# Patient Record
Sex: MALE | Race: WHITE | NOT HISPANIC OR LATINO | Employment: FULL TIME | URBAN - METROPOLITAN AREA
[De-identification: names, ages, dates, MRNs, and addresses within clinical notes are randomized per-mention and may not be internally consistent; named-entity substitution may affect disease eponyms.]

---

## 2017-02-16 ENCOUNTER — HOSPITAL ENCOUNTER (OUTPATIENT)
Dept: RADIOLOGY | Facility: MEDICAL CENTER | Age: 37
End: 2017-02-16
Attending: NURSE PRACTITIONER
Payer: COMMERCIAL

## 2017-02-16 ENCOUNTER — OFFICE VISIT (OUTPATIENT)
Dept: URGENT CARE | Facility: PHYSICIAN GROUP | Age: 37
End: 2017-02-16
Payer: COMMERCIAL

## 2017-02-16 VITALS
TEMPERATURE: 98.7 F | SYSTOLIC BLOOD PRESSURE: 128 MMHG | DIASTOLIC BLOOD PRESSURE: 90 MMHG | HEIGHT: 69 IN | RESPIRATION RATE: 16 BRPM | OXYGEN SATURATION: 100 % | BODY MASS INDEX: 45.18 KG/M2 | HEART RATE: 72 BPM | WEIGHT: 305 LBS

## 2017-02-16 DIAGNOSIS — K80.00 CALCULUS OF GALLBLADDER WITH ACUTE CHOLECYSTITIS WITHOUT OBSTRUCTION: ICD-10-CM

## 2017-02-16 DIAGNOSIS — R10.13 ABDOMINAL PAIN, EPIGASTRIC: ICD-10-CM

## 2017-02-16 DIAGNOSIS — K82.9 GALLBLADDER ATTACK: ICD-10-CM

## 2017-02-16 PROCEDURE — 99204 OFFICE O/P NEW MOD 45 MIN: CPT | Mod: 25 | Performed by: NURSE PRACTITIONER

## 2017-02-16 PROCEDURE — 76705 ECHO EXAM OF ABDOMEN: CPT

## 2017-02-16 RX ORDER — KETOROLAC TROMETHAMINE 30 MG/ML
30 INJECTION, SOLUTION INTRAMUSCULAR; INTRAVENOUS ONCE
Status: COMPLETED | OUTPATIENT
Start: 2017-02-16 | End: 2017-02-16

## 2017-02-16 RX ORDER — TRAMADOL HYDROCHLORIDE 150 MG/1
CAPSULE, EXTENDED RELEASE ORAL
COMMUNITY
End: 2018-05-17

## 2017-02-16 RX ADMIN — KETOROLAC TROMETHAMINE 30 MG: 30 INJECTION, SOLUTION INTRAMUSCULAR; INTRAVENOUS at 10:18

## 2017-02-16 ASSESSMENT — ENCOUNTER SYMPTOMS
ANOREXIA: 1
FEVER: 0
HEARTBURN: 0
DIARRHEA: 0
NAUSEA: 1
ABDOMINAL PAIN: 1
VOMITING: 0
PALPITATIONS: 0
WEIGHT LOSS: 0
COUGH: 0

## 2017-02-16 NOTE — MR AVS SNAPSHOT
"        Noah Cervantes   2017 9:00 AM   Office Visit   MRN: [id number]    Department:  [facility]   Dept Phone:  [phone]    Description:  Male : 1980   Provider:  DARYN Luna           Reason for Visit     Abdominal Pain epigastric area, onset 4am      Allergies as of 2017     Allergen Noted Reactions    Dilaudid [Hydromorphone] 2017   Itching      You were diagnosed with     Abdominal pain, epigastric   [789.06.ICD-9-CM]       Gallbladder attack   [366273]         Vital Signs     Blood Pressure Pulse Temperature Respirations Height Weight    128/90 mmHg 72 37.1 °C (98.7 °F) 16 1.753 m (5' 9\") 138.347 kg (305 lb)    Body Mass Index Oxygen Saturation Smoking Status             45.02 kg/m2 100% Former Smoker         Basic Information     Date Of Birth Sex Race Ethnicity Preferred Language    1980 Male  or  Non- English      Your appointments     2017  2:30 PM   US ABDOMEN FASTING (30 MINUTES) with [facility]   [facility]    [address] [place] [address]  [facility] [address]   [phone]           NPO 8 hours.  For  Abdomen, NPO 2 hours, schedule Abdomen Complete and include \" Abdomen\" in Appt Notes.              Health Maintenance        Date Due Completion Dates    IMM DTaP/Tdap/Td Vaccine (1 - Tdap) 1998 ---    IMM INFLUENZA (1) 2016 ---            Current Immunizations     No immunizations on file.      Below and/or attached are the medications your provider expects you to take. Review all of your home medications and newly ordered medications with your provider and/or pharmacist. Follow medication instructions as directed by your provider and/or pharmacist. Please keep your medication list with you and share with your provider. Update the information when medications are discontinued, doses are changed, or new medications (including over-the-counter products) are added; and carry medication " information at all times in the event of emergency situations     Allergies:  DILAUDID - Itching               Medications  Valid as of: February 16, 2017 - 10:20 AM    Generic Name Brand Name Tablet Size Instructions for use    TraMADol HCl (CAPSULE SR 24 HR) TraMADol HCl 150 MG Take  by mouth.        .                 Medicines prescribed today were sent to:     [id number] - [place], [place] - [address]    [address] [place] [address]    Phone: [phone] Fax: [phone]    Open 24 Hours?: No      Medication refill instructions:       If your prescription bottle indicates you have medication refills left, it is not necessary to call your provider’s office. Please contact your pharmacy and they will refill your medication.    If your prescription bottle indicates you do not have any refills left, you may request refills at any time through one of the following ways: The online [employer] system (except Urgent Care), by calling your provider’s office, or by asking your pharmacy to contact your provider’s office with a refill request. Medication refills are processed only during regular business hours and may not be available until the next business day. Your provider may request additional information or to have a follow-up visit with you prior to refilling your medication.   *Please Note: Medication refills are assigned a new Rx number when refilled electronically. Your pharmacy may indicate that no refills were authorized even though a new prescription for the same medication is available at the pharmacy. Please request the medicine by name with the pharmacy before contacting your provider for a refill.        Your To Do List     Future Labs/Procedures Complete By Expires    US-GALLBLADDER  As directed 2/16/2018         [employer] Access Code: [email or web address]  Expires: 3/18/2017 10:20 AM    Your email address is not on file at [facility].  Email Addresses are required for you to sign up for  [employer] [facility], please contact [phone] to verify your personal information and to provide your email address prior to attempting to register for VM Discovery.    Noah Cervantes  [address]  [place] [address]    VM Discovery  A secure, online tool to manage your health information     [employer]’s VM Discovery® is a secure, online tool that connects you to your personalized health information from the privacy of your home -- day or night - making it very easy for you to manage your healthcare. Once the activation process is completed, you can even access your medical information using the VM Discovery ahsan, which is available for free in the Apple Ahsan store or Google Play store.     To learn more about VM Discovery, visit [email or web address]    There are two levels of access available (as shown below):   My Chart Features  [facility]  Specialists [facility]  Urgent  Care Non-[facility] Doctor   Email your healthcare team securely and privately 24/7 X X X    Manage appointments: schedule your next appointment; view details of past/upcoming appointments X      Request prescription refills. X      View recent personal medical records, including lab and immunizations X X X X   View health record, including health history, allergies, medications X X X X   Read reports about your outpatient visits, procedures, consult and ER notes X X X X   See your discharge summary, which is a recap of your hospital and/or ER visit that includes your diagnosis, lab results, and care plan X X  X     How to register for VM Discovery:  Once your e-mail address has been verified, follow the following steps to sign up for VM Discovery.     1. Go to  [email or web address]  2. Click on the Sign Up Now box, which takes you to the New Member Sign Up page. You will need to provide the following information:  a. Enter your VM Discovery Access Code exactly as it appears at the top of this page. (You will not need to use this code after  you’ve completed the sign-up process. If you do not sign up before the expiration date, you must request a new code.)   b. Enter your date of birth.   c. Enter your home email address.   d. Click Submit, and follow the next screen’s instructions.  3. Create a Vitet ID. This will be your Vitet login ID and cannot be changed, so think of one that is secure and easy to remember.  4. Create a Vitet password. You can change your password at any time.  5. Enter your Password Reset Question and Answer. This can be used at a later time if you forget your password.   6. Enter your e-mail address. This allows you to receive e-mail notifications when new information is available in [employer].  7. Click Sign Up. You can now view your health information.    For assistance activating your [employer] account, call [phone]

## 2017-02-16 NOTE — PROGRESS NOTES
"Subjective:      Noah Cervantes is a 36 y.o. male who presents with Abdominal Pain            Abdominal Pain  This is a new problem. The current episode started today (at 4 am). The onset quality is sudden. The problem occurs constantly. The problem has been gradually worsening. The pain is located in the epigastric region and RUQ. The pain is at a severity of 7/10. The pain is severe. The quality of the pain is colicky and sharp. The abdominal pain radiates to the back and right flank. Associated symptoms include anorexia and nausea. Pertinent negatives include no diarrhea, fever, vomiting or weight loss. Nothing aggravates the pain. The pain is relieved by nothing. He has tried nothing for the symptoms. His past medical history is significant for gallstones.       Review of Systems   Constitutional: Negative for fever, weight loss and malaise/fatigue.   Respiratory: Negative for cough.    Cardiovascular: Negative for chest pain, palpitations and leg swelling.   Gastrointestinal: Positive for nausea, abdominal pain and anorexia. Negative for heartburn, vomiting and diarrhea.   All other systems reviewed and are negative.    PMH:  has no past medical history on file.  MEDS:   Current outpatient prescriptions:   •  TraMADol HCl 150 MG CAPSULE SR 24 HR, Take  by mouth., Disp: , Rfl:     Current facility-administered medications:   •  ketorolac (TORADOL) injection 30 mg, 30 mg, Intramuscular, Once, DARYN Luna  ALLERGIES:   Allergies   Allergen Reactions   • Dilaudid [Hydromorphone] Itching     SURGHX: History reviewed. No pertinent past surgical history.  SOCHX:  reports that he quit smoking about 20 years ago. He has never used smokeless tobacco. He reports that he does not drink alcohol or use illicit drugs.  FH: family history is not on file.       Objective:     /90 mmHg  Pulse 72  Temp(Src) 37.1 °C (98.7 °F)  Resp 16  Ht 1.753 m (5' 9\")  Wt 138.347 kg (305 lb)  BMI 45.02 kg/m2  SpO2 " 100%     Physical Exam   Constitutional: He is oriented to person, place, and time. Vital signs are normal. He appears well-developed and well-nourished.   HENT:   Head: Normocephalic.   Eyes: EOM are normal. Pupils are equal, round, and reactive to light.   Neck: Normal range of motion.   Cardiovascular: Normal rate and regular rhythm.    Pulmonary/Chest: Effort normal.   Abdominal: Soft. Bowel sounds are normal. He exhibits no mass. There is tenderness in the right upper quadrant and epigastric area. There is rebound, guarding (RUQ and epigastric region) and positive Chino's sign.   Musculoskeletal: Normal range of motion.   Neurological: He is alert and oriented to person, place, and time.   Skin: Skin is warm and dry.   Psychiatric: He has a normal mood and affect. His behavior is normal. Thought content normal.   Vitals reviewed.         US pending  HISTORY/REASON FOR EXAM:  Abdominal and epigastric pain.      TECHNIQUE/EXAM DESCRIPTION AND NUMBER OF VIEWS:  Real-time sonography of the gallbladder.    COMPARISON: None    FINDINGS:  Hepatic echotexture is coarsened and increased.. No discrete mass is identified. The liver measures approximately 17.8 cm.    Shadowing calculi are in the gallbladder. There are several echogenic foci with ringdown artifact. The gallbladder wall measures approximately 0.34 cm  There is no pericholecystic fluid.    The common duct measures 0.51 cm.    The visualized pancreas is unremarkable.    The visualized aorta is normal in caliber.    Intrahepatic IVC is patent.    The portal vein is patent with hepatopetal flow.    The right kidney measures 10.86 cm.    There is no ascites.         Impression        1.  Cholelithiasis without wall thickening or pericholecystic fluid to suggest acute cholecystitis.    2.  Echogenic foci with ringdown artifact in the gallbladder are suggestive of adenomyomatosis.    3.  Increased hepatic echotexture, likely fatty infiltration. Hepatocellular  disease can have a similar appearance.        Assessment/Plan:     1. Abdominal pain, epigastric  2. Gallbladder attack  - US-GALLBLADDER; Future  - ketorolac (TORADOL) injection 30 mg; 1 mL by Intramuscular route Once.    Discussed results with patient. Advised him that I do not think this needs to be taken care of emergently, so I put in a referral for gen. Surgery.  Advised pt that if he feels his s/s are becoming increasingly worse or new concerning symptoms develop to be seen at the ER  He agrees with this POC.  Encouraged a diet of low-fat, non-fried foods

## 2017-02-17 ENCOUNTER — APPOINTMENT (OUTPATIENT)
Dept: RADIOLOGY | Facility: MEDICAL CENTER | Age: 37
End: 2017-02-17
Attending: EMERGENCY MEDICINE
Payer: COMMERCIAL

## 2017-02-17 ENCOUNTER — HOSPITAL ENCOUNTER (OUTPATIENT)
Facility: MEDICAL CENTER | Age: 37
End: 2017-02-19
Attending: EMERGENCY MEDICINE | Admitting: SURGERY
Payer: COMMERCIAL

## 2017-02-17 DIAGNOSIS — K81.0 ACUTE EMPHYSEMATOUS CHOLECYSTITIS: ICD-10-CM

## 2017-02-17 PROBLEM — K81.9 CHOLECYSTITIS: Status: ACTIVE | Noted: 2017-02-17

## 2017-02-17 LAB
ALBUMIN SERPL BCP-MCNC: 3.7 G/DL (ref 3.2–4.9)
ALBUMIN/GLOB SERPL: 1.1 G/DL
ALP SERPL-CCNC: 82 U/L (ref 30–99)
ALT SERPL-CCNC: 16 U/L (ref 2–50)
ANION GAP SERPL CALC-SCNC: 7 MMOL/L (ref 0–11.9)
APPEARANCE UR: CLEAR
AST SERPL-CCNC: 13 U/L (ref 12–45)
BASOPHILS # BLD AUTO: 0.7 % (ref 0–1.8)
BASOPHILS # BLD: 0.11 K/UL (ref 0–0.12)
BILIRUB SERPL-MCNC: 1.2 MG/DL (ref 0.1–1.5)
BILIRUB UR QL STRIP.AUTO: NEGATIVE
BUN SERPL-MCNC: 5 MG/DL (ref 8–22)
CALCIUM SERPL-MCNC: 9.1 MG/DL (ref 8.4–10.2)
CHLORIDE SERPL-SCNC: 104 MMOL/L (ref 96–112)
CO2 SERPL-SCNC: 26 MMOL/L (ref 20–33)
COLOR UR: YELLOW
CREAT SERPL-MCNC: 0.95 MG/DL (ref 0.5–1.4)
EKG IMPRESSION: NORMAL
EOSINOPHIL # BLD AUTO: 0.72 K/UL (ref 0–0.51)
EOSINOPHIL NFR BLD: 4.7 % (ref 0–6.9)
ERYTHROCYTE [DISTWIDTH] IN BLOOD BY AUTOMATED COUNT: 36.7 FL (ref 35.9–50)
GFR SERPL CREATININE-BSD FRML MDRD: >60 ML/MIN/1.73 M 2
GLOBULIN SER CALC-MCNC: 3.5 G/DL (ref 1.9–3.5)
GLUCOSE SERPL-MCNC: 94 MG/DL (ref 65–99)
GLUCOSE UR STRIP.AUTO-MCNC: NEGATIVE MG/DL
HCT VFR BLD AUTO: 48.2 % (ref 42–52)
HGB BLD-MCNC: 16.2 G/DL (ref 14–18)
IMM GRANULOCYTES # BLD AUTO: 0.04 K/UL (ref 0–0.11)
IMM GRANULOCYTES NFR BLD AUTO: 0.3 % (ref 0–0.9)
KETONES UR STRIP.AUTO-MCNC: NEGATIVE MG/DL
LEUKOCYTE ESTERASE UR QL STRIP.AUTO: NEGATIVE
LIPASE SERPL-CCNC: 28 U/L (ref 7–58)
LYMPHOCYTES # BLD AUTO: 3.68 K/UL (ref 1–4.8)
LYMPHOCYTES NFR BLD: 24 % (ref 22–41)
MCH RBC QN AUTO: 26 PG (ref 27–33)
MCHC RBC AUTO-ENTMCNC: 33.6 G/DL (ref 33.7–35.3)
MCV RBC AUTO: 77.4 FL (ref 81.4–97.8)
MICRO URNS: ABNORMAL
MONOCYTES # BLD AUTO: 1.08 K/UL (ref 0–0.85)
MONOCYTES NFR BLD AUTO: 7.1 % (ref 0–13.4)
MUCOUS THREADS #/AREA URNS HPF: ABNORMAL /HPF
NEUTROPHILS # BLD AUTO: 9.68 K/UL (ref 1.82–7.42)
NEUTROPHILS NFR BLD: 63.2 % (ref 44–72)
NITRITE UR QL STRIP.AUTO: NEGATIVE
NRBC # BLD AUTO: 0 K/UL
NRBC BLD AUTO-RTO: 0 /100 WBC
PH UR STRIP.AUTO: 6 [PH]
PLATELET # BLD AUTO: 305 K/UL (ref 164–446)
PMV BLD AUTO: 8.8 FL (ref 9–12.9)
POTASSIUM SERPL-SCNC: 3.7 MMOL/L (ref 3.6–5.5)
PROT SERPL-MCNC: 7.2 G/DL (ref 6–8.2)
PROT UR QL STRIP: NEGATIVE MG/DL
RBC # BLD AUTO: 6.23 M/UL (ref 4.7–6.1)
RBC # URNS HPF: ABNORMAL /HPF
RBC UR QL AUTO: ABNORMAL
SODIUM SERPL-SCNC: 137 MMOL/L (ref 135–145)
SP GR UR STRIP.AUTO: 1.02
WBC # BLD AUTO: 15.3 K/UL (ref 4.8–10.8)
WBC #/AREA URNS HPF: ABNORMAL /HPF

## 2017-02-17 PROCEDURE — 36415 COLL VENOUS BLD VENIPUNCTURE: CPT

## 2017-02-17 PROCEDURE — 76705 ECHO EXAM OF ABDOMEN: CPT

## 2017-02-17 PROCEDURE — 96375 TX/PRO/DX INJ NEW DRUG ADDON: CPT

## 2017-02-17 PROCEDURE — 83690 ASSAY OF LIPASE: CPT

## 2017-02-17 PROCEDURE — A9270 NON-COVERED ITEM OR SERVICE: HCPCS | Performed by: EMERGENCY MEDICINE

## 2017-02-17 PROCEDURE — 700111 HCHG RX REV CODE 636 W/ 250 OVERRIDE (IP)

## 2017-02-17 PROCEDURE — 700105 HCHG RX REV CODE 258: Performed by: EMERGENCY MEDICINE

## 2017-02-17 PROCEDURE — 80053 COMPREHEN METABOLIC PANEL: CPT

## 2017-02-17 PROCEDURE — 700102 HCHG RX REV CODE 250 W/ 637 OVERRIDE(OP): Performed by: EMERGENCY MEDICINE

## 2017-02-17 PROCEDURE — 700111 HCHG RX REV CODE 636 W/ 250 OVERRIDE (IP): Performed by: EMERGENCY MEDICINE

## 2017-02-17 PROCEDURE — G0378 HOSPITAL OBSERVATION PER HR: HCPCS

## 2017-02-17 PROCEDURE — 99285 EMERGENCY DEPT VISIT HI MDM: CPT

## 2017-02-17 PROCEDURE — 93005 ELECTROCARDIOGRAM TRACING: CPT | Performed by: EMERGENCY MEDICINE

## 2017-02-17 PROCEDURE — 81001 URINALYSIS AUTO W/SCOPE: CPT

## 2017-02-17 PROCEDURE — 700101 HCHG RX REV CODE 250

## 2017-02-17 PROCEDURE — 96365 THER/PROPH/DIAG IV INF INIT: CPT

## 2017-02-17 PROCEDURE — 85025 COMPLETE CBC W/AUTO DIFF WBC: CPT

## 2017-02-17 RX ORDER — IBUPROFEN 800 MG/1
800 TABLET ORAL EVERY 8 HOURS PRN
COMMUNITY
End: 2020-09-15

## 2017-02-17 RX ORDER — SODIUM CHLORIDE 9 MG/ML
INJECTION, SOLUTION INTRAVENOUS CONTINUOUS
Status: DISCONTINUED | OUTPATIENT
Start: 2017-02-17 | End: 2017-02-19 | Stop reason: HOSPADM

## 2017-02-17 RX ORDER — ONDANSETRON 2 MG/ML
4 INJECTION INTRAMUSCULAR; INTRAVENOUS
Status: DISCONTINUED | OUTPATIENT
Start: 2017-02-17 | End: 2017-02-19 | Stop reason: HOSPADM

## 2017-02-17 RX ORDER — ONDANSETRON 2 MG/ML
4 INJECTION INTRAMUSCULAR; INTRAVENOUS ONCE
Status: COMPLETED | OUTPATIENT
Start: 2017-02-17 | End: 2017-02-17

## 2017-02-17 RX ADMIN — PIPERACILLIN SODIUM AND TAZOBACTAM SODIUM 4.5 G: 4; .5 INJECTION, POWDER, FOR SOLUTION INTRAVENOUS at 22:18

## 2017-02-17 RX ADMIN — LIDOCAINE HYDROCHLORIDE 30 ML: 20 SOLUTION OROPHARYNGEAL at 20:44

## 2017-02-17 RX ADMIN — SODIUM CHLORIDE 1000 ML: 9 INJECTION, SOLUTION INTRAVENOUS at 22:18

## 2017-02-17 RX ADMIN — ONDANSETRON 4 MG: 2 INJECTION, SOLUTION INTRAMUSCULAR; INTRAVENOUS at 22:06

## 2017-02-17 RX ADMIN — FENTANYL CITRATE 100 MCG: 50 INJECTION, SOLUTION INTRAMUSCULAR; INTRAVENOUS at 22:06

## 2017-02-17 ASSESSMENT — LIFESTYLE VARIABLES
EVER_SMOKED: YES
ALCOHOL_USE: NO

## 2017-02-17 ASSESSMENT — PAIN SCALES - GENERAL
PAINLEVEL_OUTOF10: 4
PAINLEVEL_OUTOF10: 6

## 2017-02-17 NOTE — IP AVS SNAPSHOT
" Home Care Instructions                                                                                                                  Name:Noah Cervantes  Medical Record Number:[id number]  CSN: [id number]    YOB: 1980   Age: 36 y.o.  Sex: male  HT:1.753 m (5' 9\") WT: 138 kg (304 lb 3.8 oz)          Admit Date: 2/17/2017     Discharge Date:   Today's Date: 2/19/2017  Attending Doctor:  Felipe Montilla M.D.                  Allergies:  Dilaudid            Discharge Instructions       Discharge Instructions    Discharged to home by car with relative. Discharged via wheelchair, hospital escort: Yes.  Special equipment needed: Not Applicable    Be sure to schedule a follow-up appointment with your primary care doctor or any specialists as instructed.     Discharge Plan:   Diet Plan: Discussed  Activity Level: Discussed  Confirmed Follow up Appointment: Patient to Call and Schedule Appointment  Confirmed Symptoms Management: Discussed  Medication Reconciliation Updated: Yes  Influenza Vaccine Indication: Patient Refuses    I understand that a diet low in cholesterol, fat, and sodium is recommended for good health. Unless I have been given specific instructions below for another diet, I accept this instruction as my diet prescription.   Other diet: Small meals as tolerated    Special Instructions: no baths no immersion in water    · Is patient discharged on Warfarin / Coumadin?   No     · Is patient Post Blood Transfusion?  No    Depression / Suicide Risk    As you are discharged from this [facility] facility, it is important to learn how to keep safe from harming yourself.    Recognize the warning signs:  · Abrupt changes in personality, positive or negative- including increase in energy   · Giving away possessions  · Change in eating patterns- significant weight changes-  positive or negative  · Change in sleeping patterns- unable to sleep or sleeping all the time   · Unwillingness or inability to " communicate  · Depression  · Unusual sadness, discouragement and loneliness  · Talk of wanting to die  · Neglect of personal appearance   · Rebelliousness- reckless behavior  · Withdrawal from people/activities they love  · Confusion- inability to concentrate     If you or a loved one observes any of these behaviors or has concerns about self-harm, here's what you can do:  · Talk about it- your feelings and reasons for harming yourself  · Remove any means that you might use to hurt yourself (examples: pills, rope, extension cords, firearm)  · Get professional help from the community (Mental Health, Substance Abuse, psychological counseling)  · Do not be alone:Call your Safe Contact- someone whom you trust who will be there for you.  · Call your local CRISIS HOTLINE 103-6752 or 446-091-9043  · Call your local Children's Mobile Crisis Response Team [place] (939) 797-1110 or www.99tests  · Call the toll free National Suicide Prevention Hotlines   · National Suicide Prevention Lifeline 952-658-SYMR (3879)  · National Hope Line Network 800-SUICIDE (519-2513)        Follow-up Information     1. Follow up with Felipe Montilla M.D.. Call in 1 week.    Specialties:  Surgery, Radiology    Contact information    [address]  [place] [address]  [phone]          2. Follow up with Felipe Montilla M.D. In 10 days.    Specialties:  Surgery, Radiology    Contact information    [address]  [place] [address]  [phone]           Discharge Medication Instructions:    Below are the medications your physician expects you to take upon discharge:    Review all your home medications and newly ordered medications with your doctor and/or pharmacist. Follow medication instructions as directed by your doctor and/or pharmacist.    Please keep your medication list with you and share with your physician.               Medication List      START taking these medications        Instructions    docusate  sodium 100 MG Caps   Commonly known as:  COLACE    Take 1 Cap by mouth 2 times a day.   Dose:  100 mg       oxycodone immediate-release 5 MG Tabs   Last time this was given:  5 mg on 2/19/2017  9:38 AM   Commonly known as:  ROXICODONE    Take 1 Tab by mouth every 3 hours as needed for Severe Pain (Moderate Pain (NRS Pain Scale 4-6; CPOT Pain Scale 3-5)).   Dose:  5 mg       promethazine 25 MG Tabs   Commonly known as:  PHENERGAN    Take 1 Tab by mouth every 6 hours as needed for Nausea/Vomiting.   Dose:  25 mg         CONTINUE taking these medications        Instructions    ibuprofen 800 MG Tabs   Commonly known as:  MOTRIN    Take 800 mg by mouth every 8 hours as needed.   Dose:  800 mg       TraMADol HCl 150 MG Cp24    Take  by mouth.               Instructions           Diet / Nutrition:    Follow any diet instructions given to you by your doctor or the dietician, including how much salt (sodium) you are allowed each day.    If you are overweight, talk to your doctor about a weight reduction plan.    Activity:    Remain physically active following your doctor's instructions about exercise and activity.    Rest often.     Any time you become even a little tired or short of breath, SIT DOWN and rest.    Worsening Symptoms:    Report any of the following signs and symptoms to the doctor's office immediately:    *Pain of jaw, arm, or neck  *Chest pain not relieved by medication                               *Dizziness or loss of consciousness  *Difficulty breathing even when at rest   *More tired than usual                                       *Bleeding drainage or swelling of surgical site  *Swelling of feet, ankles, legs or stomach                 *Fever (>100ºF)  *Pink or blood tinged sputum  *Weight gain (3lbs/day or 5lbs /week)           *Shock from internal defibrillator (if applicable)  *Palpitations or irregular heartbeats                *Cool and/or numb extremities    Stroke Awareness    Common Risk  Factors for Stroke include:    Age  Atrial Fibrillation  Carotid Artery Stenosis  Diabetes Mellitus  Excessive alcohol consumption  High blood pressure  Overweight   Physical inactivity  Smoking    Warning signs and symptoms of a stroke include:    *Sudden numbness or weakness of the face, arm or leg (especially on one side of the body).  *Sudden confusion, trouble speaking or understanding.  *Sudden trouble seeing in one or both eyes.  *Sudden trouble walking, dizziness, loss of balance or coordination.Sudden severe headache with no known cause.    It is very important to get treatment quickly when a stroke occurs. If you experience any of the above warning signs, call 911 immediately.                   Disclaimer         Quit Smoking / Tobacco Use:    I understand the use of any tobacco products increases my chance of suffering from future heart disease or stroke and could cause other illnesses which may shorten my life. Quitting the use of tobacco products is the single most important thing I can do to improve my health. For further information on smoking / tobacco cessation call a Toll Free Quit Line at 1-481.755.9084 (*National Cancer [place]) or 1-317.325.3356 (American Lung Association) or you can access the web based program at www.lungusa.org.    [place] Tobacco Users Help Line:  (192) 531-9856       Toll Free: 1-610.750.3469  Quit Tobacco Program [facility] (118)029-2269    Crisis Hotline:    [place] Crisis Hotline:  1-506-LAZTSSU or 1-543.807.3781    [place] Crisis Hotline:    1-162.623.8061 or 701-772-1350    Discharge Survey:   Thank you for choosing [facility]. We hope we did everything we could to make your hospital stay a pleasant one. You may be receiving a phone survey and we would appreciate your time and participation in answering the questions. Your input is very valuable to us in our efforts to improve our service to our patients and their families.        My  signature on this form indicates that:    1. I have reviewed and understand the above information.  2. My questions regarding this information have been answered to my satisfaction.  3. I have formulated a plan with my discharge nurse to obtain my prescribed medications for home.                  Disclaimer         __________________________________                     __________       ________                       Patient Signature                                                 Date                    Time

## 2017-02-17 NOTE — IP AVS SNAPSHOT
2/19/2017          Noah Cervantes  [address]  [place] [address]    Dear Noah:    [facility] wants to ensure your discharge home is safe and you or your loved ones have had all your questions answered regarding your care after you leave the hospital.    You may receive a telephone call within two days of your discharge.  This call is to make certain you understand your discharge instructions as well as ensure we provided you with the best care possible during your stay with us.     The call will only last approximately 3-5 minutes and will be done by a nurse.    Once again, we want to ensure your discharge home is safe and that you have a clear understanding of any next steps in your care.  If you have any questions or concerns, please do not hesitate to contact us, we are here for you.  Thank you for choosing [facility] for your healthcare needs.    Sincerely,    Jin Ramos    [facility]

## 2017-02-17 NOTE — IP AVS SNAPSHOT
" <p align=\"LEFT\"><IMG SRC=\"//[email or web address]$/[email or web address]\" alt=\"Image\" WIDTH=\"50%\" HEIGHT=\"200\" BORDER=\"\"></p>                   Name:Noah Cervantes  Medical Record Number:[id number]  CSN: [id number]    YOB: 1980   Age: 36 y.o.  Sex: male  HT:1.753 m (5' 9\") WT: 138 kg (304 lb 3.8 oz)          Admit Date: 2/17/2017     Discharge Date:   Today's Date: 2/19/2017  Attending Doctor:  Felipe Montilla M.D.                  Allergies:  Dilaudid          Follow-up Information     1. Follow up with Felipe Montilla M.D.. Call in 1 week.    Specialties:  Surgery, Radiology    Contact information    [address]  [place] [address]  [phone]          2. Follow up with Felipe Montilla M.D. In 10 days.    Specialties:  Surgery, Radiology    Contact information    [address]  [place] [address]  [phone]           Medication List      Take these Medications        Instructions    docusate sodium 100 MG Caps   Commonly known as:  COLACE    Take 1 Cap by mouth 2 times a day.   Dose:  100 mg       ibuprofen 800 MG Tabs   Commonly known as:  MOTRIN    Take 800 mg by mouth every 8 hours as needed.   Dose:  800 mg       oxycodone immediate-release 5 MG Tabs   Commonly known as:  ROXICODONE    Take 1 Tab by mouth every 3 hours as needed for Severe Pain (Moderate Pain (NRS Pain Scale 4-6; CPOT Pain Scale 3-5)).   Dose:  5 mg       promethazine 25 MG Tabs   Commonly known as:  PHENERGAN    Take 1 Tab by mouth every 6 hours as needed for Nausea/Vomiting.   Dose:  25 mg       TraMADol HCl 150 MG Cp24    Take  by mouth.         "

## 2017-02-17 NOTE — IP AVS SNAPSHOT
CultureMap Access Code: [email or web address]  Expires: 3/18/2017 10:20 AM    CultureMap  A secure, online tool to manage your health information     [employer]s CultureMap® is a secure, online tool that connects you to your personalized health information from the privacy of your home -- day or night - making it very easy for you to manage your healthcare. Once the activation process is completed, you can even access your medical information using the CultureMap ahsan, which is available for free in the Apple Ahsan store or Google Play store.     CultureMap provides the following levels of access (as shown below):   My Chart Features   [facility] Primary Care Doctor [facility]  Specialists [facility]  Urgent  Care Non-[facility]  Primary Care  Doctor   Email your healthcare team securely and privately 24/7 X X X X   Manage appointments: schedule your next appointment; view details of past/upcoming appointments X      Request prescription refills. X      View recent personal medical records, including lab and immunizations X X X X   View health record, including health history, allergies, medications X X X X   Read reports about your outpatient visits, procedures, consult and ER notes X X X X   See your discharge summary, which is a recap of your hospital and/or ER visit that includes your diagnosis, lab results, and care plan. X X       How to register for CultureMap:  1. Go to  [email or web address].  2. Click on the Sign Up Now box, which takes you to the New Member Sign Up page. You will need to provide the following information:  a. Enter your CultureMap Access Code exactly as it appears at the top of this page. (You will not need to use this code after you’ve completed the sign-up process. If you do not sign up before the expiration date, you must request a new code.)   b. Enter your date of birth.   c. Enter your home email address.   d. Click Submit, and follow the next screen’s instructions.  3. Create a CultureMap ID. This will be your Simple Admitt  login ID and cannot be changed, so think of one that is secure and easy to remember.  4. Create a [place] password. You can change your password at any time.  5. Enter your Password Reset Question and Answer. This can be used at a later time if you forget your password.   6. Enter your e-mail address. This allows you to receive e-mail notifications when new information is available in [employer].  7. Click Sign Up. You can now view your health information.    For assistance activating your [place] account, call [phone]

## 2017-02-18 LAB — PATHOLOGY CONSULT NOTE: NORMAL

## 2017-02-18 PROCEDURE — 500002 HCHG ADHESIVE, DERMABOND: Performed by: SURGERY

## 2017-02-18 PROCEDURE — 160028 HCHG SURGERY MINUTES - 1ST 30 MINS LEVEL 3: Performed by: SURGERY

## 2017-02-18 PROCEDURE — 160035 HCHG PACU - 1ST 60 MINS PHASE I: Performed by: SURGERY

## 2017-02-18 PROCEDURE — 700101 HCHG RX REV CODE 250: Performed by: SURGERY

## 2017-02-18 PROCEDURE — 500800 HCHG LAPAROSCOPIC J/L HOOK: Performed by: SURGERY

## 2017-02-18 PROCEDURE — 501838 HCHG SUTURE GENERAL: Performed by: SURGERY

## 2017-02-18 PROCEDURE — 700101 HCHG RX REV CODE 250

## 2017-02-18 PROCEDURE — A4606 OXYGEN PROBE USED W OXIMETER: HCPCS | Performed by: SURGERY

## 2017-02-18 PROCEDURE — 502571 HCHG PACK, LAP CHOLE: Performed by: SURGERY

## 2017-02-18 PROCEDURE — 160039 HCHG SURGERY MINUTES - EA ADDL 1 MIN LEVEL 3: Performed by: SURGERY

## 2017-02-18 PROCEDURE — 88304 TISSUE EXAM BY PATHOLOGIST: CPT

## 2017-02-18 PROCEDURE — 160009 HCHG ANES TIME/MIN: Performed by: SURGERY

## 2017-02-18 PROCEDURE — 501570 HCHG TROCAR, SEPARATOR: Performed by: SURGERY

## 2017-02-18 PROCEDURE — 503053 HCHG HEMOSTAT POWDER-3GRAM: Performed by: SURGERY

## 2017-02-18 PROCEDURE — 160048 HCHG OR STATISTICAL LEVEL 1-5: Performed by: SURGERY

## 2017-02-18 PROCEDURE — 700102 HCHG RX REV CODE 250 W/ 637 OVERRIDE(OP): Performed by: SURGERY

## 2017-02-18 PROCEDURE — 501399 HCHG SPECIMAN BAG, ENDO CATC: Performed by: SURGERY

## 2017-02-18 PROCEDURE — A9270 NON-COVERED ITEM OR SERVICE: HCPCS | Performed by: SURGERY

## 2017-02-18 PROCEDURE — 700105 HCHG RX REV CODE 258: Performed by: EMERGENCY MEDICINE

## 2017-02-18 PROCEDURE — 501582 HCHG TROCAR, THRD BLADED: Performed by: SURGERY

## 2017-02-18 PROCEDURE — 500697 HCHG HEMOCLIP, LARGE (ORANGE): Performed by: SURGERY

## 2017-02-18 PROCEDURE — 501583 HCHG TROCAR, THRD CAN&SEAL 5X100: Performed by: SURGERY

## 2017-02-18 PROCEDURE — 700111 HCHG RX REV CODE 636 W/ 250 OVERRIDE (IP)

## 2017-02-18 PROCEDURE — 700102 HCHG RX REV CODE 250 W/ 637 OVERRIDE(OP)

## 2017-02-18 PROCEDURE — 700105 HCHG RX REV CODE 258: Performed by: SURGERY

## 2017-02-18 PROCEDURE — 96366 THER/PROPH/DIAG IV INF ADDON: CPT

## 2017-02-18 PROCEDURE — 502594 HCHG SCISSOR HANDLE, HARMONIC ACE: Performed by: SURGERY

## 2017-02-18 PROCEDURE — 96375 TX/PRO/DX INJ NEW DRUG ADDON: CPT

## 2017-02-18 PROCEDURE — G0378 HOSPITAL OBSERVATION PER HR: HCPCS

## 2017-02-18 PROCEDURE — 110382 HCHG SHELL REV 271: Performed by: SURGERY

## 2017-02-18 PROCEDURE — 500868 HCHG NEEDLE, SURGI(VARES): Performed by: SURGERY

## 2017-02-18 PROCEDURE — 700111 HCHG RX REV CODE 636 W/ 250 OVERRIDE (IP): Performed by: SURGERY

## 2017-02-18 PROCEDURE — 160002 HCHG RECOVERY MINUTES (STAT): Performed by: SURGERY

## 2017-02-18 PROCEDURE — 700105 HCHG RX REV CODE 258

## 2017-02-18 PROCEDURE — 110371 HCHG SHELL REV 272: Performed by: SURGERY

## 2017-02-18 PROCEDURE — 700111 HCHG RX REV CODE 636 W/ 250 OVERRIDE (IP): Performed by: EMERGENCY MEDICINE

## 2017-02-18 PROCEDURE — A9270 NON-COVERED ITEM OR SERVICE: HCPCS

## 2017-02-18 PROCEDURE — 96376 TX/PRO/DX INJ SAME DRUG ADON: CPT

## 2017-02-18 RX ORDER — MEPERIDINE HYDROCHLORIDE 25 MG/ML
INJECTION INTRAMUSCULAR; INTRAVENOUS; SUBCUTANEOUS
Status: COMPLETED
Start: 2017-02-18 | End: 2017-02-18

## 2017-02-18 RX ORDER — LORAZEPAM 2 MG/ML
0.5-1 INJECTION INTRAMUSCULAR EVERY 6 HOURS PRN
Status: DISCONTINUED | OUTPATIENT
Start: 2017-02-18 | End: 2017-02-19 | Stop reason: HOSPADM

## 2017-02-18 RX ORDER — SODIUM CHLORIDE, SODIUM LACTATE, POTASSIUM CHLORIDE, CALCIUM CHLORIDE 600; 310; 30; 20 MG/100ML; MG/100ML; MG/100ML; MG/100ML
INJECTION, SOLUTION INTRAVENOUS CONTINUOUS
Status: DISCONTINUED | OUTPATIENT
Start: 2017-02-18 | End: 2017-02-19 | Stop reason: HOSPADM

## 2017-02-18 RX ORDER — OXYCODONE HYDROCHLORIDE 5 MG/1
5 TABLET ORAL
Status: DISCONTINUED | OUTPATIENT
Start: 2017-02-18 | End: 2017-02-19 | Stop reason: HOSPADM

## 2017-02-18 RX ORDER — BUPIVACAINE HYDROCHLORIDE AND EPINEPHRINE 5; 5 MG/ML; UG/ML
INJECTION, SOLUTION EPIDURAL; INTRACAUDAL; PERINEURAL
Status: DISCONTINUED | OUTPATIENT
Start: 2017-02-18 | End: 2017-02-18 | Stop reason: HOSPADM

## 2017-02-18 RX ORDER — IBUPROFEN 400 MG/1
800 TABLET ORAL EVERY 8 HOURS PRN
Status: DISCONTINUED | OUTPATIENT
Start: 2017-02-18 | End: 2017-02-19 | Stop reason: HOSPADM

## 2017-02-18 RX ORDER — OXYCODONE HYDROCHLORIDE AND ACETAMINOPHEN 5; 325 MG/1; MG/1
TABLET ORAL
Status: COMPLETED
Start: 2017-02-18 | End: 2017-02-18

## 2017-02-18 RX ORDER — ONDANSETRON 2 MG/ML
4 INJECTION INTRAMUSCULAR; INTRAVENOUS EVERY 4 HOURS PRN
Status: DISCONTINUED | OUTPATIENT
Start: 2017-02-18 | End: 2017-02-19 | Stop reason: HOSPADM

## 2017-02-18 RX ORDER — MORPHINE SULFATE 4 MG/ML
4 INJECTION, SOLUTION INTRAMUSCULAR; INTRAVENOUS
Status: DISCONTINUED | OUTPATIENT
Start: 2017-02-18 | End: 2017-02-19 | Stop reason: HOSPADM

## 2017-02-18 RX ORDER — OXYCODONE HYDROCHLORIDE 10 MG/1
10 TABLET ORAL
Status: DISCONTINUED | OUTPATIENT
Start: 2017-02-18 | End: 2017-02-19 | Stop reason: HOSPADM

## 2017-02-18 RX ORDER — ACETAMINOPHEN 325 MG/1
650 TABLET ORAL EVERY 6 HOURS
Status: DISCONTINUED | OUTPATIENT
Start: 2017-02-18 | End: 2017-02-19 | Stop reason: HOSPADM

## 2017-02-18 RX ADMIN — SODIUM CHLORIDE, POTASSIUM CHLORIDE, SODIUM LACTATE AND CALCIUM CHLORIDE: 600; 310; 30; 20 INJECTION, SOLUTION INTRAVENOUS at 13:00

## 2017-02-18 RX ADMIN — OXYCODONE HYDROCHLORIDE 5 MG: 5 TABLET ORAL at 21:42

## 2017-02-18 RX ADMIN — MEPERIDINE HYDROCHLORIDE 12.5 MG: 25 INJECTION INTRAMUSCULAR; INTRAVENOUS; SUBCUTANEOUS at 12:06

## 2017-02-18 RX ADMIN — OXYCODONE HYDROCHLORIDE AND ACETAMINOPHEN 2 TABLET: 5; 325 TABLET ORAL at 11:56

## 2017-02-18 RX ADMIN — FAMOTIDINE 20 MG: 10 INJECTION, SOLUTION INTRAVENOUS at 21:44

## 2017-02-18 RX ADMIN — CEFOTETAN DISODIUM 2 G: 1 INJECTION, POWDER, FOR SOLUTION INTRAMUSCULAR; INTRAVENOUS at 21:44

## 2017-02-18 RX ADMIN — FENTANYL CITRATE 100 MCG: 50 INJECTION, SOLUTION INTRAMUSCULAR; INTRAVENOUS at 05:24

## 2017-02-18 RX ADMIN — CEFOTETAN DISODIUM 2 G: 1 INJECTION, POWDER, FOR SOLUTION INTRAMUSCULAR; INTRAVENOUS at 13:14

## 2017-02-18 RX ADMIN — MEPERIDINE HYDROCHLORIDE 12.5 MG: 25 INJECTION INTRAMUSCULAR; INTRAVENOUS; SUBCUTANEOUS at 12:11

## 2017-02-18 RX ADMIN — OXYCODONE HYDROCHLORIDE 10 MG: 10 TABLET ORAL at 15:01

## 2017-02-18 RX ADMIN — OXYCODONE HYDROCHLORIDE 5 MG: 5 TABLET ORAL at 18:05

## 2017-02-18 RX ADMIN — MEPERIDINE HYDROCHLORIDE 25 MG: 25 INJECTION INTRAMUSCULAR; INTRAVENOUS; SUBCUTANEOUS at 12:25

## 2017-02-18 RX ADMIN — MORPHINE SULFATE 4 MG: 4 INJECTION INTRAVENOUS at 13:26

## 2017-02-18 RX ADMIN — FENTANYL CITRATE 50 MCG: 50 INJECTION, SOLUTION INTRAMUSCULAR; INTRAVENOUS at 12:01

## 2017-02-18 RX ADMIN — ACETAMINOPHEN 650 MG: 325 TABLET, FILM COATED ORAL at 18:05

## 2017-02-18 RX ADMIN — FENTANYL CITRATE 100 MCG: 50 INJECTION, SOLUTION INTRAMUSCULAR; INTRAVENOUS at 02:23

## 2017-02-18 RX ADMIN — SODIUM CHLORIDE: 9 INJECTION, SOLUTION INTRAVENOUS at 04:53

## 2017-02-18 RX ADMIN — ACETAMINOPHEN 650 MG: 325 TABLET, FILM COATED ORAL at 13:14

## 2017-02-18 RX ADMIN — SODIUM CHLORIDE, POTASSIUM CHLORIDE, SODIUM LACTATE AND CALCIUM CHLORIDE: 600; 310; 30; 20 INJECTION, SOLUTION INTRAVENOUS at 18:07

## 2017-02-18 RX ADMIN — FENTANYL CITRATE 100 MCG: 50 INJECTION, SOLUTION INTRAMUSCULAR; INTRAVENOUS at 09:12

## 2017-02-18 RX ADMIN — FENTANYL CITRATE 50 MCG: 50 INJECTION, SOLUTION INTRAMUSCULAR; INTRAVENOUS at 11:56

## 2017-02-18 RX ADMIN — FAMOTIDINE 20 MG: 10 INJECTION, SOLUTION INTRAVENOUS at 13:14

## 2017-02-18 ASSESSMENT — PAIN SCALES - GENERAL
PAINLEVEL_OUTOF10: 2
PAINLEVEL_OUTOF10: 4
PAINLEVEL_OUTOF10: 7
PAINLEVEL_OUTOF10: 6
PAINLEVEL_OUTOF10: 5
PAINLEVEL_OUTOF10: 5
PAINLEVEL_OUTOF10: 3
PAINLEVEL_OUTOF10: 3
PAINLEVEL_OUTOF10: 7
PAINLEVEL_OUTOF10: 7
PAINLEVEL_OUTOF10: 8
PAINLEVEL_OUTOF10: 4
PAINLEVEL_OUTOF10: 5
PAINLEVEL_OUTOF10: 7
PAINLEVEL_OUTOF10: 5
PAINLEVEL_OUTOF10: 0
PAINLEVEL_OUTOF10: 8

## 2017-02-18 NOTE — PROGRESS NOTES
Patient arrived to floor via cart from ED.  Patient is alert and oriented and walked from the hallway into the room with steady gait.  Patient states pain in abdomen 4/10 and also c/o headache.  IV fluids infusing at 150ml/hr through PIV.  Oxygen saturation is 95% on room air and breathing appears unlabored.  Discussed plans for NPO after midnight with patient.  Call light in reach and patient instructed to call for assistance with ambulation.

## 2017-02-18 NOTE — PROGRESS NOTES
1240- Report received from PACU RN    1250- Pt transferred back to -2 to via hospital bed. Assumed care of pt. Pt A&Ox4. POC discussed. Pt verbalized understanding. No signs of distress or discomfort noted at this time. Pt instructed to use call light for assistance. Call light and personal belongings within reach. Pt denies any concerns at this time. All questions answered. Safety measures in place. Will continue to monitor.

## 2017-02-18 NOTE — OP REPORT
DATE OF SERVICE:  02/18/2017    PREOPERATIVE DIAGNOSIS:  Acute cholecystitis.    POSTOPERATIVE DIAGNOSIS:  Severe acute and chronic cholecystitis.    STAFF:  Felipe Montilla MD    ANESTHESIA:  General.    PROCEDURES PERFORMED:  Laparoscopic cholecystectomy.    ANESTHESIOLOGIST:  Martir Porter DO.    INDICATION FOR PROCEDURE:  This is a 36-year-old male who presented with   several days of worsening abdominal pain.  He underwent an ultrasound   consistent with emphysematous cholecystitis.  The patient was counseled   extensively as to the risks versus benefits of surgery and agreed to proceed   fully informed.    DESCRIPTION OF PROCEDURE:  The patient was prepped and draped in the standard   sterile surgical fashion after induction of general anesthesia, appropriate   timeout had been performed, antibiotics delivered.  A supraumbilical Veress   insertion was performed and the abdomen was insufflated to 15 mmHg with CO2.    Two right upper quadrant 5 mm trocars and one subxiphoid 12 were placed under   direct visualization.  The gallbladder was severely distended and adhered to   the overlying omentum.  I used a decompressing laparoscopic needle to   decompress the gallbladder.  He had what appeared to be the beginning of   hydropic gallbladder.  I then retracted the gallbladder superiorly and   laterally.  His gallbladder was massively dilated.  I carefully took down the   overlying omental adhesions.  I dissected the contents of Calot's triangle   clearly free.  Once critical view of safety was obtained, the cystic duct was   triply clipped proximally and singly clipped distally and ligated.  The cystic   artery was singly clipped and then the Harmonic scalpel was used to detached   from its attachments of the gallbladder.  The gallbladder was then removed   from its attachments of liver bed using combination of Harmonic scalpel and   electrocautery.  I then placed in an Endobag and obtained meticulous    hemostasis.  I copiously irrigated the right upper quadrant until clear.  The   clips were in place.  There was no biliary spillage.  Hemostasis was   meticulously achieved.  I did place Antione powder as a hemostatic adjunct.    Once that was done, I then removed the gallbladder via the subxiphoid   incision, which had to be significantly extended as the gallbladder barely fit   into the EndoCatch bag.  I then closed the fascia with a running 0 Vicryl   suture, Monocryl was used for skin edges.  Dermabond was applied as a   dressing.  Patient was extubated and taken to recovery in satisfactory   condition.    DISPOSITION:  To the PACU for recovery.    COMPLICATIONS:  None noted.    ESTIMATED BLOOD LOSS:  125 mL       ____________________________________     MD SONAM Mckay / DANIELLE    DD:  02/18/2017 11:45:52  DT:  02/18/2017 12:01:56    D#:  [id number]  Job#:  [id number]    cc: HENRIK CONNER

## 2017-02-18 NOTE — OR NURSING
1150 - Patient admitted from OR to PACU drowsy with spontaneous respirations and clear breath sounds bilaterally. X4 abdominal stab wounds with Dermabod all clean, dry, and intact. Abdomen large, rounded, and soft. Denies pain. Denies nausea. Report from SAMUEL Ortez and Dr. Porter. VS as noted.     1205 - Less drowsy. Pain 8/10 - medicated with Fentanyl and Oxycodone as noted on MAR. Denies nausea - tolerating sips of water. VS as noted.     1220 - Less drowsy. Pain 5/10 and tolerable. Denies nausea. VS as noted.     1225 - Patient C/O 7/10 pain - medicated with Demerol as noted on MAR.     1235 - Awake and cooperative Pain now 4-5/10 and very tolerable. Denies nausea - tolerating  water. Dressings remain clean, dry, and intact. Abdomen soft. VS as noted. Meets criteria for transfer to room. Report called to KENIA Workman.     1245 - Patient transferred via bed to room on O2 via NC at 2 lpm by 2 RNs. Additional handoff report at bedside to KENIA Workman. Patient hooked up to all bedside monitoring equipment.

## 2017-02-18 NOTE — ED NOTES
Pt assessed, chart reviewed. EP at BS performing eval. Pt c/o fever and abdominal pain, has recent dx cholelithiasis, has o/p appt with [facility] for gallstones. Call light within reach, no additional needs at this time.

## 2017-02-18 NOTE — H&P
"HISTORY AND PHYSICAL UPDATE    CHIEF COMPLAINT: abdominal pain.     HISTORY OF PRESENT ILLNESS: The patient is a 36 y.o. male, who was previously seen in an urgent care and diagnosed with gallstones, presents to [facility] ER with worsening symptoms.    He describes right upper quadrant abdominal pain, worse with eating and associated with nausea.    It usually goes away after several hours, however, this pain persisted.    He underwent a workup to include a RUQ us consistent with cholecystitis, as well as WBC of 15k.    His liver function tests were normal.   General surgery was consulted for evaluation and management.     PAST MEDICAL HISTORY:   obesity, back pain    PAST SURGICAL HISTORY: back surgery     ALLERGIES:   Allergies   Allergen Reactions   • Dilaudid [Hydromorphone] Itching        CURRENT MEDICATIONS:   Home Medications     Reviewed by Ángel Ennis R.N. (Registered Nurse) on 02/17/17 at 1930  Med List Status: Complete    Medication Last Dose Status    ibuprofen (MOTRIN) 800 MG Tab 2/17/2017 Active    TraMADol HCl 150 MG CAPSULE SR 24 HR 2/17/2017 Active                FAMILY HISTORY: History reviewed. No pertinent family history.     SOCIAL HISTORY:   Social History     Social History Main Topics   • Smoking status: Former Smoker     Quit date: 02/16/1997   • Smokeless tobacco: Never Used   • Alcohol Use: No   • Drug Use: No   • Sexual Activity: Not on file       REVIEW OF SYSTEMS: Comprehensive review of systems was negative aside from the above hpi     PHYSICAL EXAMINATION:     GENERAL: The patient is in no distress.   VITAL SIGNS: Blood pressure 135/81, pulse 86, temperature 37.2 °C (98.9 °F), resp. rate 18, height 1.753 m (5' 9\"), weight 138 kg (304 lb 3.8 oz), SpO2 93 %.  HEAD AND NECK: Demonstrates symmetric, reactive pupils. Extraocular muscles   are intact. Nares and oropharynx are clear.   NECK: Supple. No adenopathy.  CHEST: No respiratory distress    CARDIOVASCULAR: Regular rate "   ABDOMEN: obese.   TPP in the ruq.  No mass appreciated however body habitus makes examination difficult.      EXTREMITIES: Examination of the upper and lower extremities demonstrates no cyanosis edema or clubbing.  NEUROLOGIC: Alert & oriented x 3, Normal motor function, Normal sensory function, No focal deficits noted.    LABORATORY VALUES:   Recent Labs      02/17/17 2040   WBC  15.3*   RBC  6.23*   HEMOGLOBIN  16.2   HEMATOCRIT  48.2   MCV  77.4*   MCH  26.0*   MCHC  33.6*   RDW  36.7   PLATELETCT  305   MPV  8.8*     Recent Labs      02/17/17 2040   SODIUM  137   POTASSIUM  3.7   CHLORIDE  104   CO2  26   GLUCOSE  94   BUN  5*   CREATININE  0.95   CALCIUM  9.1     Recent Labs      02/17/17 2040   ASTSGOT  13   ALTSGPT  16   TBILIRUBIN  1.2   ALKPHOSPHAT  82   GLOBULIN  3.5            IMAGING:   US-GALLBLADDER   Final Result      1.  Cholelithiasis with mild gallbladder wall thickening   2.  Changes in the gallbladder wall could indicate emphysematous cholecystitis or possibly calcification in the gallbladder wall. This could be further assessed with CT.   3.  New mild extrahepatic biliary dilatation. Distal obstruction is possible.   4.  Recommend surgical consultation.      Findings were discussed with DR. CARTY on 2/17/2017 9:58 PM.             IMPRESSION AND PLAN: Acute cholecystitis.    The patient will be taken to the operating room for a laparoscopic cholecystectomy . The surgical conduct was explained. Potential complications including but not limited to infection, bleeding, damage to adjacent structures, anesthetic complications were discussed in detail. Questions were elicited and answered to his satisfaction. He understands the rationale for surgery and elects to proceed.    Operative consent signed.  ____________________________________   Felipe Montilla M.D.    DD: 2/18/2017 DT: 10:36 AM

## 2017-02-18 NOTE — CARE PLAN
Problem: Safety  Goal: Will remain free from injury  Outcome: PROGRESSING AS EXPECTED  Call light and personal belongings within reach. Bed in lowest position. Bed rails up x2. Hourly rounding. Treaded slippers in place.     Problem: Knowledge Deficit  Goal: Knowledge of disease process/condition, treatment plan, diagnostic tests, and medications will improve  Outcome: PROGRESSING AS EXPECTED  POC discussed, pt verbalized understanding.

## 2017-02-18 NOTE — ED PROVIDER NOTES
"ED Provider Note    CHIEF COMPLAINT  Chief Complaint   Patient presents with   • Fever   • Abdominal Pain       HPI  Noah Cervantes is a 36 y.o. male who presents with persistent abdominal pain. Past medical history largely unremarkable. He notes that he was seen at [facility] urgent care yesterday for upper abdominal pain where they did complete a gallbladder ultrasound which did show gallstones although no other evidence of acute cholecystitis. He was given return precautions and due to the fact that he had persistent and worsening pain currently at 5 out of 6 and sharp in nature as well as additional chills today he was concerned that he may have worsening disease process and came to the ER. He says the pain is worse with any movement or by mouth intake. No radiation of pain. No diarrhea or constipation or urinary changes. He did not take his temperature at home although again he did feel chills and occasionally flushed.    REVIEW OF SYSTEMS  See HPI for further details. All other systems are negative.     PAST MEDICAL HISTORY    largely benign past medical history. Chronic back pain. Prior lumbar surgery    SOCIAL HISTORY  Social History     Social History Main Topics   • Smoking status: Former Smoker     Quit date: 02/16/1997   • Smokeless tobacco: Never Used   • Alcohol Use: No   • Drug Use: No   • Sexual Activity: Not on file       SURGICAL HISTORY  patient denies any surgical history    CURRENT MEDICATIONS  Home Medications     Reviewed by Ángel Ennis R.N. (Registered Nurse) on 02/17/17 at 1930  Med List Status: Complete    Medication Last Dose Status    ibuprofen (MOTRIN) 800 MG Tab 2/17/2017 Active    TraMADol HCl 150 MG CAPSULE SR 24 HR 2/17/2017 Active                ALLERGIES  Allergies   Allergen Reactions   • Dilaudid [Hydromorphone] Itching       PHYSICAL EXAM  VITAL SIGNS: /93 mmHg  Pulse 92  Temp(Src) 37.6 °C (99.7 °F)  Resp 18  Ht 1.753 m (5' 9\")  Wt 138 kg (304 lb 3.8 oz)  BMI 44.91 " kg/m2  SpO2 98%   Pulse ox interpretation: I interpret this pulse ox as normal.  Constitutional: Alert in no apparent distress.  HENT: No signs of trauma, Bilateral external ears normal, Nose normal.   Eyes: Pupils are equal and reactive, Conjunctiva normal, Non-icteric.   Neck: Normal range of motion, No tenderness, Supple, No stridor.   Lymphatic: No lymphadenopathy noted.   Cardiovascular: Regular rate and rhythm, no murmurs.   Thorax & Lungs: Normal breath sounds, No respiratory distress, No wheezing, No chest tenderness.   Abdomen: Bowel sounds normal, Soft, midepigastric and right upper quadrant tenderness, No masses, No pulsatile masses. No peritoneal signs. Overweight  Skin: Warm, Dry, No erythema, No rash.   Back: No bony tenderness, No CVA tenderness.   Extremities: Intact distal pulses, No edema, No tenderness, No cyanosis  Musculoskeletal: Good range of motion in all major joints. No tenderness to palpation or major deformities noted.   Neurologic: Alert , Normal motor function, Normal sensory function, No focal deficits noted.   Psychiatric: Affect normal, Judgment normal, Mood normal.       DIAGNOSTIC STUDIES / PROCEDURES    EKG  2038: Sinus rhythm at a rate 84, normal axis, normal intervals, no acute ischemic changes    LABS  Results for orders placed or performed during the hospital encounter of 02/17/17   CBC WITH DIFFERENTIAL   Result Value Ref Range    WBC 15.3 (H) 4.8 - 10.8 K/uL    RBC 6.23 (H) 4.70 - 6.10 M/uL    Hemoglobin 16.2 14.0 - 18.0 g/dL    Hematocrit 48.2 42.0 - 52.0 %    MCV 77.4 (L) 81.4 - 97.8 fL    MCH 26.0 (L) 27.0 - 33.0 pg    MCHC 33.6 (L) 33.7 - 35.3 g/dL    RDW 36.7 35.9 - 50.0 fL    Platelet Count 305 164 - 446 K/uL    MPV 8.8 (L) 9.0 - 12.9 fL    Neutrophils-Polys 63.20 44.00 - 72.00 %    Lymphocytes 24.00 22.00 - 41.00 %    Monocytes 7.10 0.00 - 13.40 %    Eosinophils 4.70 0.00 - 6.90 %    Basophils 0.70 0.00 - 1.80 %    Immature Granulocytes 0.30 0.00 - 0.90 %     Nucleated RBC 0.00 /100 WBC    Neutrophils (Absolute) 9.68 (H) 1.82 - 7.42 K/uL    Lymphs (Absolute) 3.68 1.00 - 4.80 K/uL    Monos (Absolute) 1.08 (H) 0.00 - 0.85 K/uL    Eos (Absolute) 0.72 (H) 0.00 - 0.51 K/uL    Baso (Absolute) 0.11 0.00 - 0.12 K/uL    Immature Granulocytes (abs) 0.04 0.00 - 0.11 K/uL    NRBC (Absolute) 0.00 K/uL   COMP METABOLIC PANEL   Result Value Ref Range    Sodium 137 135 - 145 mmol/L    Potassium 3.7 3.6 - 5.5 mmol/L    Chloride 104 96 - 112 mmol/L    Co2 26 20 - 33 mmol/L    Anion Gap 7.0 0.0 - 11.9    Glucose 94 65 - 99 mg/dL    Bun 5 (L) 8 - 22 mg/dL    Creatinine 0.95 0.50 - 1.40 mg/dL    Calcium 9.1 8.4 - 10.2 mg/dL    AST(SGOT) 13 12 - 45 U/L    ALT(SGPT) 16 2 - 50 U/L    Alkaline Phosphatase 82 30 - 99 U/L    Total Bilirubin 1.2 0.1 - 1.5 mg/dL    Albumin 3.7 3.2 - 4.9 g/dL    Total Protein 7.2 6.0 - 8.2 g/dL    Globulin 3.5 1.9 - 3.5 g/dL    A-G Ratio 1.1 g/dL   LIPASE   Result Value Ref Range    Lipase 28 7 - 58 U/L   ESTIMATED GFR   Result Value Ref Range    GFR If African American >60 >60 mL/min/1.73 m 2    GFR If Non African American >60 >60 mL/min/1.73 m 2         RADIOLOGY  US-GALLBLADDER   Final Result      1.  Cholelithiasis with mild gallbladder wall thickening   2.  Changes in the gallbladder wall could indicate emphysematous cholecystitis or possibly calcification in the gallbladder wall. This could be further assessed with CT.   3.  New mild extrahepatic biliary dilatation. Distal obstruction is possible.   4.  Recommend surgical consultation.      Findings were discussed with DR. CARTY on 2/17/2017 9:58 PM.                 COURSE & MEDICAL DECISION MAKING  Pertinent Labs & Imaging studies reviewed. (See chart for details)  Patient presented numerous murmur for worsening abdominal pain. History physical exams above. On further evaluation significant for leukocytosis. Repeat ultrasound in 24 on arrival does show concern for possible emphysematous cholecystitis.  Cholelithiasis remains present. I have discussed the case with Dr. Montilla on call for general surgery was agreeable to ongoing inpatient care and likely definitive treatment with surgery. The patient will be started on Zosyn as well as ongoing over the evening narcotic pain management and antiemetics. Patient is having trace by [facility], findings and plan.      FINAL IMPRESSION  1. Acute emphysematous cholecystitis            Electronically signed by: Antony Alvarez, 2/17/2017 8:32 PM

## 2017-02-18 NOTE — PROGRESS NOTES
Report received from Stephie AQUINO. Assumed care of pt. Pt resting in bed. Pt A&Ox4. POC discussed. Pt verbalized understanding. No signs of distress or discomfort noted at this time. Pt instructed to use call light for assistance. Call light and personal belongings within reach. Pt denies any concerns at this time. All questions answered. Safety measures in place. Will continue to monitor.

## 2017-02-19 VITALS
DIASTOLIC BLOOD PRESSURE: 64 MMHG | HEART RATE: 69 BPM | BODY MASS INDEX: 45.06 KG/M2 | WEIGHT: 304.24 LBS | HEIGHT: 69 IN | SYSTOLIC BLOOD PRESSURE: 117 MMHG | RESPIRATION RATE: 19 BRPM | OXYGEN SATURATION: 96 % | TEMPERATURE: 98.2 F

## 2017-02-19 LAB
ERYTHROCYTE [DISTWIDTH] IN BLOOD BY AUTOMATED COUNT: 37.9 FL (ref 35.9–50)
HCT VFR BLD AUTO: 42.5 % (ref 42–52)
HGB BLD-MCNC: 14.1 G/DL (ref 14–18)
MCH RBC QN AUTO: 26.1 PG (ref 27–33)
MCHC RBC AUTO-ENTMCNC: 33.2 G/DL (ref 33.7–35.3)
MCV RBC AUTO: 78.6 FL (ref 81.4–97.8)
PLATELET # BLD AUTO: 288 K/UL (ref 164–446)
PMV BLD AUTO: 9.5 FL (ref 9–12.9)
RBC # BLD AUTO: 5.41 M/UL (ref 4.7–6.1)
WBC # BLD AUTO: 16 K/UL (ref 4.8–10.8)

## 2017-02-19 PROCEDURE — 700101 HCHG RX REV CODE 250: Performed by: SURGERY

## 2017-02-19 PROCEDURE — 700111 HCHG RX REV CODE 636 W/ 250 OVERRIDE (IP): Performed by: SURGERY

## 2017-02-19 PROCEDURE — 96366 THER/PROPH/DIAG IV INF ADDON: CPT

## 2017-02-19 PROCEDURE — 700102 HCHG RX REV CODE 250 W/ 637 OVERRIDE(OP): Performed by: SURGERY

## 2017-02-19 PROCEDURE — G0378 HOSPITAL OBSERVATION PER HR: HCPCS

## 2017-02-19 PROCEDURE — 700105 HCHG RX REV CODE 258: Performed by: SURGERY

## 2017-02-19 PROCEDURE — 36415 COLL VENOUS BLD VENIPUNCTURE: CPT

## 2017-02-19 PROCEDURE — 85027 COMPLETE CBC AUTOMATED: CPT

## 2017-02-19 PROCEDURE — A9270 NON-COVERED ITEM OR SERVICE: HCPCS | Performed by: SURGERY

## 2017-02-19 PROCEDURE — 96376 TX/PRO/DX INJ SAME DRUG ADON: CPT

## 2017-02-19 RX ORDER — DOCUSATE SODIUM 100 MG/1
100 CAPSULE, LIQUID FILLED ORAL 2 TIMES DAILY
Qty: 60 CAP | Refills: 0 | Status: SHIPPED | OUTPATIENT
Start: 2017-02-19 | End: 2018-05-17

## 2017-02-19 RX ORDER — PROMETHAZINE HYDROCHLORIDE 25 MG/1
25 TABLET ORAL EVERY 6 HOURS PRN
Qty: 30 TAB | Refills: 0 | Status: SHIPPED | OUTPATIENT
Start: 2017-02-19 | End: 2018-05-17

## 2017-02-19 RX ORDER — OXYCODONE HYDROCHLORIDE 5 MG/1
5 TABLET ORAL
Qty: 35 TAB | Refills: 0 | Status: SHIPPED | OUTPATIENT
Start: 2017-02-19 | End: 2018-05-17

## 2017-02-19 RX ADMIN — CEFOTETAN DISODIUM 2 G: 1 INJECTION, POWDER, FOR SOLUTION INTRAMUSCULAR; INTRAVENOUS at 09:35

## 2017-02-19 RX ADMIN — OXYCODONE HYDROCHLORIDE 5 MG: 5 TABLET ORAL at 09:38

## 2017-02-19 RX ADMIN — OXYCODONE HYDROCHLORIDE 10 MG: 10 TABLET ORAL at 01:59

## 2017-02-19 RX ADMIN — FAMOTIDINE 20 MG: 10 INJECTION, SOLUTION INTRAVENOUS at 09:32

## 2017-02-19 RX ADMIN — ACETAMINOPHEN 650 MG: 325 TABLET, FILM COATED ORAL at 01:59

## 2017-02-19 ASSESSMENT — PAIN SCALES - GENERAL: PAINLEVEL_OUTOF10: 7

## 2017-02-19 NOTE — PROGRESS NOTES
1900    Report taken, assumed care of Pt. Pt denies pain or discomfort at this time. Reviewed POC for the shift and all questions answered. Call light and possessions within reach. Pt denies needs at this time. Will continue to monitor.

## 2017-02-19 NOTE — DISCHARGE INSTRUCTIONS
Discharge Instructions    Discharged to home by car with relative. Discharged via wheelchair, hospital escort: Yes.  Special equipment needed: Not Applicable    Be sure to schedule a follow-up appointment with your primary care doctor or any specialists as instructed.     Discharge Plan:   Diet Plan: Discussed  Activity Level: Discussed  Confirmed Follow up Appointment: Patient to Call and Schedule Appointment  Confirmed Symptoms Management: Discussed  Medication Reconciliation Updated: Yes  Influenza Vaccine Indication: Patient Refuses    I understand that a diet low in cholesterol, fat, and sodium is recommended for good health. Unless I have been given specific instructions below for another diet, I accept this instruction as my diet prescription.   Other diet: Small meals as tolerated    Special Instructions: no baths no immersion in water    · Is patient discharged on Warfarin / Coumadin?   No     · Is patient Post Blood Transfusion?  No    Depression / Suicide Risk    As you are discharged from this [facility] facility, it is important to learn how to keep safe from harming yourself.    Recognize the warning signs:  · Abrupt changes in personality, positive or negative- including increase in energy   · Giving away possessions  · Change in eating patterns- significant weight changes-  positive or negative  · Change in sleeping patterns- unable to sleep or sleeping all the time   · Unwillingness or inability to communicate  · Depression  · Unusual sadness, discouragement and loneliness  · Talk of wanting to die  · Neglect of personal appearance   · Rebelliousness- reckless behavior  · Withdrawal from people/activities they love  · Confusion- inability to concentrate     If you or a loved one observes any of these behaviors or has concerns about self-harm, here's what you can do:  · Talk about it- your feelings and reasons for harming yourself  · Remove any means that you might use to hurt yourself (examples:  pills, rope, extension cords, firearm)  · Get professional help from the community (Mental Health, Substance Abuse, psychological counseling)  · Do not be alone:Call your Safe Contact- someone whom you trust who will be there for you.  · Call your local CRISIS HOTLINE 514-4043 or 717-672-7091  · Call your local Children's Mobile Crisis Response Team [place] (133) 773-5668 or www.Drug Response Dx  · Call the toll free National Suicide Prevention Hotlines   · National Suicide Prevention Lifeline 036-803-UIYJ (8344)  · National Hope Line Network 800-SUICIDE (779-8642)

## 2017-02-19 NOTE — CARE PLAN
Problem: Knowledge Deficit  Goal: Knowledge of disease process/condition, treatment plan, diagnostic tests, and medications will improve  Outcome: PROGRESSING AS EXPECTED  Discussed POC for shift. Discussed disease process and treatments in place/ordered.Pt verbalized understanding. All questions answered at this time.    Problem: Pain Management  Goal: Pain level will decrease to patient’s comfort goal  Outcome: PROGRESSING AS EXPECTED  Discussed pain scales and available interventions for alleviation of pain. Pt verbalizes understanding and agreement to notify RN for rising or breakthrough pain. All questions answered at this time.

## 2017-02-19 NOTE — DISCHARGE SUMMARY
Admitting dx:  Cholecystitis  Discharge dx:  Same  Procedures:  Laparoscopic cholecystectomy  Admitted 2/17   D/c 2/19  Hospital course:  Pt underwent a difficult but uncomplicated cholecystectomy.   He tolerated it well.  He was tolerating a diet and had normal and stable vital signs.   His pain was well controlled.  He was discharged home on POD#1 without event  D/c instructions:  1.   Follow up with me in 1-2 weeks  2.   Follow up sooner for problems  3.   Prescribed pain killers, stool softener, and an antiemetic.

## 2017-02-20 NOTE — CARE PLAN
Problem: Discharge Barriers/Planning  Goal: Patient’s continuum of care needs will be met  Intervention: Explain discharge instructions and medication reconcilliation to patient and significant other/support system  1030    Meets all criteria for discharge  Pt verbalizes understanding of discharge instructions  Discharged to home

## 2017-07-26 NOTE — CARE PLAN
DISCHARGE SUMMARY and INSTRUCTIONS:    You are being discharged undelivered because you and your baby are in stable condition.    STATUS NOTE:  Date:  7/26/2017  Time: 2:43 PM  Destination:  Home    ACTIVITY:  As tolerated    DIET:  · Continue to eat a healthy pregnancy diet  · Be sure to drink 8-10 glasses of water a day  · Don't go more than 8-10 hours without eating or drinking    CONTACT YOUR DOCTOR IF YOU HAVE ANY OF THESE WARNING SIGNS OF PREGNANCY:  · Sudden and/or constant pain  · Vaginal bleeding  · Sudden gush or leaking fluid from the vagina  · Fainting  · Headache that will not go away with your normal comfort measures  · Any changes in your vision, such as blurry vision, double vision or spots/stars before your eyes  · Severe nausea and vomiting  · Little or no urine, pain and burning with urination, or blood in your urine  · Chills or fever  · Increase or change in vaginal discharge  · Your baby moves less than usual (See Fetal Movement Counting below.)  · Contractions lasting longer than 30 seconds and are 5 minutes apart and more regular or stronger.      FETAL MOVEMENT COUNTING:  One way you can know your baby is doing well during pregnancy is to pay attention to his or her movements.  We recommend counting your baby's movements once each day beginning in the third trimester, or about the 26th week of pregnancy.      How to count baby's movements:  · Choose a time that is convenient for you when the baby is active, such as after a meal.  Try to do it at the same time each day.  · Sit comfortably or lie on your side.  · Note the time on the clock when you're ready to begin counting.  · Count each movement until the baby has moved 10 times.   · Count all movements that are either seen or felt, including shifting, rolling, or kicking.  Do not count baby's hiccoughs.  · If you have counted 10 movements in less than 2 hours, resume your normal activities and count again tomorrow.    If it takes longer  Problem: Safety  Goal: Will remain free from injury  Outcome: PROGRESSING AS EXPECTED  Bed in low and locked position.  Call light and belongings remain in reach of patient.  Urinal remains in reach of patient.  Side rails up X2.  Patient calls appropriately for assistance with ambulation.  Hourly rounding continues.         than 1 hour to count 4 movements, try these suggestions:  · Eat something if you haven't eaten within 2 to 3 hours before you count.  · Try to lie down in a quiet, undisturbed location, on either your right or left side.  · Place your hands on your belly and focus on your baby's movements.  · Continue your count from where you left off before, until you feel 10 movements.  · If it took longer than 2 hours to count 10 movements, call your midwife or doctor right away for recommendations.    Remember:  By counting and staying aware of your baby's movements, you will be helping your caregivers provide the best possible care for you and your baby.      Patient Signature_____________________________________Date_______________    I have received all my belongings upon discharge__________________          (initials)    Watch the [place] [employer] programs anytime, anywhere!  Visit [email or web address]  Enter this Password: [id number]  Watch videos and access programs

## 2018-02-08 ENCOUNTER — HOSPITAL ENCOUNTER (OUTPATIENT)
Dept: LAB | Facility: MEDICAL CENTER | Age: 38
End: 2018-02-08
Attending: NURSE PRACTITIONER
Payer: COMMERCIAL

## 2018-02-08 LAB
ALBUMIN SERPL BCP-MCNC: 3.9 G/DL (ref 3.2–4.9)
ALBUMIN/GLOB SERPL: 1.3 G/DL
ALP SERPL-CCNC: 79 U/L (ref 30–99)
ALT SERPL-CCNC: 25 U/L (ref 2–50)
ANION GAP SERPL CALC-SCNC: 6 MMOL/L (ref 0–11.9)
AST SERPL-CCNC: 15 U/L (ref 12–45)
BILIRUB SERPL-MCNC: 0.6 MG/DL (ref 0.1–1.5)
BUN SERPL-MCNC: 10 MG/DL (ref 8–22)
CALCIUM SERPL-MCNC: 9.2 MG/DL (ref 8.5–10.5)
CHLORIDE SERPL-SCNC: 107 MMOL/L (ref 96–112)
CHOLEST SERPL-MCNC: 165 MG/DL (ref 100–199)
CO2 SERPL-SCNC: 27 MMOL/L (ref 20–33)
CREAT SERPL-MCNC: 0.94 MG/DL (ref 0.5–1.4)
GLOBULIN SER CALC-MCNC: 3 G/DL (ref 1.9–3.5)
GLUCOSE SERPL-MCNC: 95 MG/DL (ref 65–99)
HDLC SERPL-MCNC: 40 MG/DL
LDLC SERPL CALC-MCNC: 97 MG/DL
POTASSIUM SERPL-SCNC: 4.1 MMOL/L (ref 3.6–5.5)
PROT SERPL-MCNC: 6.9 G/DL (ref 6–8.2)
SODIUM SERPL-SCNC: 140 MMOL/L (ref 135–145)
TRIGL SERPL-MCNC: 139 MG/DL (ref 0–149)

## 2018-02-08 PROCEDURE — 36415 COLL VENOUS BLD VENIPUNCTURE: CPT

## 2018-02-08 PROCEDURE — 80053 COMPREHEN METABOLIC PANEL: CPT

## 2018-02-08 PROCEDURE — 80061 LIPID PANEL: CPT

## 2018-05-17 ENCOUNTER — HOSPITAL ENCOUNTER (EMERGENCY)
Facility: MEDICAL CENTER | Age: 38
End: 2018-05-18
Attending: EMERGENCY MEDICINE
Payer: COMMERCIAL

## 2018-05-17 ENCOUNTER — APPOINTMENT (OUTPATIENT)
Dept: RADIOLOGY | Facility: MEDICAL CENTER | Age: 38
End: 2018-05-17
Attending: EMERGENCY MEDICINE
Payer: COMMERCIAL

## 2018-05-17 DIAGNOSIS — I10 ESSENTIAL HYPERTENSION: ICD-10-CM

## 2018-05-17 DIAGNOSIS — E66.01 MORBID OBESITY (HCC): ICD-10-CM

## 2018-05-17 DIAGNOSIS — F15.10 CAFFEINE ABUSE (HCC): ICD-10-CM

## 2018-05-17 DIAGNOSIS — R00.2 HEART PALPITATIONS: ICD-10-CM

## 2018-05-17 LAB
ALBUMIN SERPL BCP-MCNC: 4 G/DL (ref 3.2–4.9)
ALBUMIN/GLOB SERPL: 1.4 G/DL
ALP SERPL-CCNC: 74 U/L (ref 30–99)
ALT SERPL-CCNC: 27 U/L (ref 2–50)
ANION GAP SERPL CALC-SCNC: 5 MMOL/L (ref 0–11.9)
APTT PPP: 29.7 SEC (ref 24.7–36)
AST SERPL-CCNC: 21 U/L (ref 12–45)
BASOPHILS # BLD AUTO: 0.6 % (ref 0–1.8)
BASOPHILS # BLD: 0.06 K/UL (ref 0–0.12)
BILIRUB SERPL-MCNC: 0.4 MG/DL (ref 0.1–1.5)
BNP SERPL-MCNC: 26 PG/ML (ref 0–100)
BUN SERPL-MCNC: 7 MG/DL (ref 8–22)
CALCIUM SERPL-MCNC: 8.8 MG/DL (ref 8.4–10.2)
CHLORIDE SERPL-SCNC: 106 MMOL/L (ref 96–112)
CO2 SERPL-SCNC: 25 MMOL/L (ref 20–33)
CREAT SERPL-MCNC: 1.05 MG/DL (ref 0.5–1.4)
EKG IMPRESSION: NORMAL
EOSINOPHIL # BLD AUTO: 0.21 K/UL (ref 0–0.51)
EOSINOPHIL NFR BLD: 2.2 % (ref 0–6.9)
ERYTHROCYTE [DISTWIDTH] IN BLOOD BY AUTOMATED COUNT: 38.3 FL (ref 35.9–50)
GLOBULIN SER CALC-MCNC: 2.9 G/DL (ref 1.9–3.5)
GLUCOSE SERPL-MCNC: 121 MG/DL (ref 65–99)
HCT VFR BLD AUTO: 46.7 % (ref 42–52)
HGB BLD-MCNC: 15.2 G/DL (ref 14–18)
IMM GRANULOCYTES # BLD AUTO: 0.03 K/UL (ref 0–0.11)
IMM GRANULOCYTES NFR BLD AUTO: 0.3 % (ref 0–0.9)
INR PPP: 1.02 (ref 0.87–1.13)
LIPASE SERPL-CCNC: 26 U/L (ref 7–58)
LYMPHOCYTES # BLD AUTO: 3.36 K/UL (ref 1–4.8)
LYMPHOCYTES NFR BLD: 34.8 % (ref 22–41)
MCH RBC QN AUTO: 25.5 PG (ref 27–33)
MCHC RBC AUTO-ENTMCNC: 32.5 G/DL (ref 33.7–35.3)
MCV RBC AUTO: 78.5 FL (ref 81.4–97.8)
MONOCYTES # BLD AUTO: 0.46 K/UL (ref 0–0.85)
MONOCYTES NFR BLD AUTO: 4.8 % (ref 0–13.4)
NEUTROPHILS # BLD AUTO: 5.54 K/UL (ref 1.82–7.42)
NEUTROPHILS NFR BLD: 57.3 % (ref 44–72)
NRBC # BLD AUTO: 0 K/UL
NRBC BLD-RTO: 0 /100 WBC
PLATELET # BLD AUTO: 287 K/UL (ref 164–446)
PMV BLD AUTO: 8.7 FL (ref 9–12.9)
POTASSIUM SERPL-SCNC: 3.5 MMOL/L (ref 3.6–5.5)
PROT SERPL-MCNC: 6.9 G/DL (ref 6–8.2)
PROTHROMBIN TIME: 13.3 SEC (ref 12–14.6)
RBC # BLD AUTO: 5.95 M/UL (ref 4.7–6.1)
SODIUM SERPL-SCNC: 136 MMOL/L (ref 135–145)
TROPONIN I SERPL-MCNC: 0.06 NG/ML (ref 0–0.04)
WBC # BLD AUTO: 9.7 K/UL (ref 4.8–10.8)

## 2018-05-17 PROCEDURE — A9270 NON-COVERED ITEM OR SERVICE: HCPCS | Performed by: EMERGENCY MEDICINE

## 2018-05-17 PROCEDURE — 83880 ASSAY OF NATRIURETIC PEPTIDE: CPT

## 2018-05-17 PROCEDURE — 85610 PROTHROMBIN TIME: CPT

## 2018-05-17 PROCEDURE — 99284 EMERGENCY DEPT VISIT MOD MDM: CPT

## 2018-05-17 PROCEDURE — 71045 X-RAY EXAM CHEST 1 VIEW: CPT

## 2018-05-17 PROCEDURE — 700102 HCHG RX REV CODE 250 W/ 637 OVERRIDE(OP): Performed by: EMERGENCY MEDICINE

## 2018-05-17 PROCEDURE — 93005 ELECTROCARDIOGRAM TRACING: CPT

## 2018-05-17 PROCEDURE — 80053 COMPREHEN METABOLIC PANEL: CPT

## 2018-05-17 PROCEDURE — 36415 COLL VENOUS BLD VENIPUNCTURE: CPT

## 2018-05-17 PROCEDURE — 85730 THROMBOPLASTIN TIME PARTIAL: CPT

## 2018-05-17 PROCEDURE — 85025 COMPLETE CBC W/AUTO DIFF WBC: CPT

## 2018-05-17 PROCEDURE — 93005 ELECTROCARDIOGRAM TRACING: CPT | Performed by: EMERGENCY MEDICINE

## 2018-05-17 PROCEDURE — 83690 ASSAY OF LIPASE: CPT

## 2018-05-17 PROCEDURE — 84484 ASSAY OF TROPONIN QUANT: CPT

## 2018-05-17 RX ORDER — HYDROCHLOROTHIAZIDE 12.5 MG/1
12.5 TABLET ORAL DAILY
Qty: 30 TAB | Refills: 0 | Status: SHIPPED | OUTPATIENT
Start: 2018-05-17 | End: 2018-06-18

## 2018-05-17 RX ADMIN — ASPIRIN 325 MG: 325 TABLET, DELAYED RELEASE ORAL at 22:26

## 2018-05-17 ASSESSMENT — PAIN SCALES - GENERAL: PAINLEVEL_OUTOF10: 2

## 2018-05-18 VITALS
DIASTOLIC BLOOD PRESSURE: 80 MMHG | BODY MASS INDEX: 46.65 KG/M2 | WEIGHT: 315 LBS | TEMPERATURE: 98.5 F | SYSTOLIC BLOOD PRESSURE: 153 MMHG | HEIGHT: 69 IN | HEART RATE: 77 BPM | RESPIRATION RATE: 14 BRPM | OXYGEN SATURATION: 99 %

## 2018-05-18 LAB
TROPONIN I SERPL-MCNC: <0.02 NG/ML (ref 0–0.04)
TROPONIN I SERPL-MCNC: <0.02 NG/ML (ref 0–0.04)

## 2018-05-18 PROCEDURE — 84484 ASSAY OF TROPONIN QUANT: CPT

## 2018-05-18 NOTE — ED NOTES
Assumed care of pt upon being roomed.in no apparent distress, sr on moniter. Results noted from triage. further orders recvd   From erp and carried out

## 2018-05-18 NOTE — ED PROVIDER NOTES
CHIEF COMPLAINT  Chief Complaint   Patient presents with   • Palpitations   • Chest Pain       HPI  Noah Cervantes is a 37 y.o. male who presents tonight with his wife with a chief complaint of heart palpitations for the last 2 days. He states they started yesterday but today there were persistent throughout the day. He had one episode of some chest pressure which is currently gone. He denies any nausea, vomiting, shortness of breath, diaphoresis. He does have a history of intermittent hypertension but has never been treated for that. He states he normally runs around 140 or 150 systolic. He recently weaned himself from tramadol and was down to 3 times a day on the lowest dose. He stopped that 2 days ago. He also drinks a lot of caffeinated products including more than a 6 pack of diet Dr Pepper as well as energy drinks. He denies any other illicit drug use tobacco or alcohol use. He states he has chronic low back pain and had been on narcotics for over 10 years.    REVIEW OF SYSTEMS  See HPI for further details. All other system reviews are negative.    PAST MEDICAL HISTORY  Chronic back pain  History of narcotic dependence currently off of all narcotics.  Hypertension  Morbid obesity    FAMILY HISTORY  History reviewed. No pertinent family history.    SOCIAL HISTORY  Social History     Social History   • Marital status: Single     Spouse name: N/A   • Number of children: N/A   • Years of education: N/A     Social History Main Topics   • Smoking status: Former Smoker     Quit date: 2/16/1997   • Smokeless tobacco: Never Used   • Alcohol use No   • Drug use: No   • Sexual activity: Not on file     Other Topics Concern   • Not on file     Social History Narrative   • No narrative on file       SURGICAL HISTORY  Past Surgical History:   Procedure Laterality Date   • FARHAD BY LAPAROSCOPY  2/18/2017    Procedure: FARHAD BY LAPAROSCOPY;  Surgeon: Felipe Montilla M.D.;  Location: SURGERY [facility];  Service:   "      CURRENT MEDICATIONS  See nurse's notes    ALLERGIES  Allergies   Allergen Reactions   • Dilaudid [Hydromorphone] Itching       PHYSICAL EXAM  VITAL SIGNS: /80   Pulse 72   Temp 36.9 °C (98.5 °F)   Resp (!) 11   Ht 1.753 m (5' 9\")   Wt (!) 150.8 kg (332 lb 7.3 oz)   SpO2 98%   BMI 49.09 kg/m²     Constitutional: Patient is well developed, well nourished in no acute distress.   HENT: Normocephalic, Oropharynx moist , nose normal with no drainage.   Eyes: PERRL, EOMI, Conjunctiva without erythema.   Neck: Supple with Normal range of motion in flexion, extension and lateral rotation. No tenderness along the bony prominences or paraspinal muscles.  Lymphatic: No lymphadenopathy noted.   Cardiovascular: Normal heart rate and rhythm. No murmur.  Thorax & Lungs: Clear and equal breath sounds with good excursion. No respiratory distress, no rhonchi, wheezing or rales.  Abdomen: Bowel sounds normal in all four quadrants. Soft, morbidly obese, nontender, no flank tenderness.  Skin: Warm, Dry.   Back: No cervical, thoracic, or lumbosacral tenderness.   Extremities: Peripheral pulses 4/4 No edema or tenderness.   Musculoskeletal: Normal range of motion in all major joints.  Neurologic: Alert & oriented x 3, Normal motor function, Normal sensory function, No lateralizing or focal deficits noted. DTR's 4/4 bilaterally.  Psychiatric: Affect normal, Judgment normal, Mood normal.     EKG  12-lead EKG was performed and read at 7:47 PM to reveal a normal sinus rhythm at a rate of 81 bpm. There is no ventricular ectopy or PACs. There is no axis deviation, QT prolongation, A-V dissociation or ST elevation or depression. There are no old EKGs for comparison.    RADIOLOGY/PROCEDURES  DX-CHEST-LIMITED (1 VIEW)   Final Result      Minimal bibasilar infiltrate or atelectasis.            COURSE & MEDICAL DECISION MAKING  Pertinent Labs & Imaging studies reviewed. (See chart for details)  Patient received Hep-Lock IV. " Laboratories were drawn upon my arrival and were really unremarkable. Apparently the hospital was having problems with their chemistry machine and all of the troponins and potassiums were being read high on the troponins and low on the potassiums. Patient's initial troponin was 0.05 and his potassium was 3.4 neither of which correlated with the patient's clinical presentation or history. His remaining labs are unremarkable. His chest x-ray shows minimal basilar atelectasis. His 12-lead EKG shows normal sinus rhythm with no acute changes. I gave the patient aspirin 324 mg chewable and repeated a troponin which was mildly elevated at 0.06. My plan will be to do a 3rd troponin in 2 hours which the patient is agreeable to stay and do and if it is elevated I will admit him otherwise he will be discharged home with a prescription for hydrochlorothiazide 12.5 mg daily with plans to follow up with his primary care doctor within the week, get a blood pressure monitor and check his pressures daily at variable times. He will start exercising daily by walking and avoid all caffeinated products.  Patient's 3rd troponin came back completely negative less than 0.02 therefore I feel that the previous 2 were elevated secondary to lab error/machinery mis-calibration. The patient has been completely asymptomatic since he's been here and he will be sent home in stable and improved condition.    FINAL IMPRESSION  1. Heart palpitations  2. Caffeine abuse  3. Recent withdrawal from tramadol  4. Morbid obesity  5. Hypertension         Electronically signed by: Therese Rodriguez, 5/17/2018 11:25 PMED Provider Note

## 2018-05-18 NOTE — DISCHARGE INSTRUCTIONS
Hypertension  Hypertension is another name for high blood pressure. High blood pressure forces your heart to work harder to pump blood. A blood pressure reading has two numbers, which includes a higher number over a lower number (example: 110/72).  Follow these instructions at home:  · Have your blood pressure rechecked by your doctor.  · Only take medicine as told by your doctor. Follow the directions carefully. The medicine does not work as well if you skip doses. Skipping doses also puts you at risk for problems.  · Do not smoke.  · Monitor your blood pressure at home as told by your doctor.  Contact a doctor if:  · You think you are having a reaction to the medicine you are taking.  · You have repeat headaches or feel dizzy.  · You have puffiness (swelling) in your ankles.  · You have trouble with your vision.  Get help right away if:  · You get a very bad headache and are confused.  · You feel weak, numb, or faint.  · You get chest or belly (abdominal) pain.  · You throw up (vomit).  · You cannot breathe very well.  This information is not intended to replace advice given to you by your health care provider. Make sure you discuss any questions you have with your health care provider.  Document Released: 06/05/2009 Document Revised: 05/25/2017 Document Reviewed: 10/10/2014  [employer] Interactive Patient Education © 2017 [employer]      Palpitations  A palpitation is the feeling that your heartbeat is irregular or is faster than normal. It may feel like your heart is fluttering or skipping a beat. Palpitations are usually not a serious problem. They may be caused by many things, including smoking, caffeine, alcohol, stress, and certain medicines. Although most causes of palpitations are not serious, palpitations can be a sign of a serious medical problem. In some cases, you may need further medical evaluation.  Follow these instructions at home:  Pay attention to any changes in your symptoms. Take these actions  to help with your condition:  · Avoid the following:  ¨ Caffeinated coffee, tea, soft drinks, diet pills, and energy drinks.  ¨ Chocolate.  ¨ Alcohol.  · Do not use any tobacco products, such as cigarettes, chewing tobacco, and e-cigarettes. If you need help quitting, ask your health care provider.  · Try to reduce your stress and anxiety. Things that can help you relax include:  ¨ Yoga.  ¨ Meditation.  ¨ Physical activity, such as swimming, jogging, or walking.  ¨ Biofeedback. This is a method that helps you learn to use your mind to control things in your body, such as your heartbeats.  · Get plenty of rest and sleep.  · Take over-the-counter and prescription medicines only as told by your health care provider.  · Keep all follow-up visits as told by your health care provider. This is important.  Contact a health care provider if:  · You continue to have a fast or irregular heartbeat after 24 hours.  · Your palpitations occur more often.  Get help right away if:  · You have chest pain or shortness of breath.  · You have a severe headache.  · You feel dizzy or you faint.  This information is not intended to replace advice given to you by your health care provider. Make sure you discuss any questions you have with your health care provider.  Document Released: 12/15/2001 Document Revised: 05/22/2017 Document Reviewed: 09/01/2016  [employer] Interactive Patient Education © 2017 [employer]      Preventing Hypertension  Hypertension, commonly called high blood pressure, is when the force of blood pumping through the arteries is too strong. Arteries are blood vessels that carry blood from the heart throughout the body. Over time, hypertension can damage the arteries and decrease blood flow to important parts of the body, including the brain, heart, and kidneys. Often, hypertension does not cause symptoms until blood pressure is very high. For this reason, it is important to have your blood pressure checked on a regular  basis.  Hypertension can often be prevented with diet and lifestyle changes. If you already have hypertension, you can control it with diet and lifestyle changes, as well as medicine.  What nutrition changes can be made?  Maintain a healthy diet. This includes:  · Eating less salt (sodium). Ask your health care provider how much sodium is safe for you to have. The general recommendation is to consume less than 1 tsp (2,300 mg) of sodium a day.  ¨ Do not add salt to your food.  ¨ Choose low-sodium options when grocery shopping and eating out.  · Limiting fats in your diet. You can do this by eating low-fat or fat-free dairy products and by eating less red meat.  · Eating more fruits, vegetables, and whole grains. Make a goal to eat:  ¨ 1½-2 cups of fresh fruits and vegetables each day.  ¨ 3-4 servings of whole grains each day.  · Avoiding foods and beverages that have added sugars.  · Eating fish that contain healthy fats (omega-3 fatty acids), such as mackerel or salmon.  If you need help putting together a healthy eating plan, try the DASH diet. This diet is high in fruits, vegetables, and whole grains. It is low in sodium, red meat, and added sugars. DASH stands for Dietary Approaches to Stop Hypertension.  What lifestyle changes can be made?  · Lose weight if you are overweight. Losing just 3?5% of your body weight can help prevent or control hypertension.  ¨ For example, if your present weight is 200 lb (91 kg), a loss of 3-5% of your weight means losing 6-10 lb (2.7-4.5 kg).  ¨ Ask your health care provider to help you with a diet and exercise plan to safely lose weight.  · Get enough exercise. Do at least 150 minutes of moderate-intensity exercise each week.  ¨ You could do this in short exercise sessions several times a day, or you could do longer exercise sessions a few times a week. For example, you could take a brisk 10-minute walk or bike ride, 3 times a day, for 5 days a week.  · Find ways to reduce  stress, such as exercising, meditating, listening to music, or taking a yoga class. If you need help reducing stress, ask your health care provider.  · Do not smoke. This includes e-cigarettes. Chemicals in tobacco and nicotine products raise your blood pressure each time you smoke. If you need help quitting, ask your health care provider.  · Avoid alcohol. If you drink alcohol, limit alcohol intake to no more than 1 drink a day for nonpregnant women and 2 drinks a day for men. One drink equals 12 oz of beer, 5 oz of wine, or 1½ oz of hard liquor.  Why are these changes important?  Diet and lifestyle changes can help you prevent hypertension, and they may make you feel better overall and improve your quality of life. If you have hypertension, making these changes will help you control it and help prevent major complications, such as:  · Hardening and narrowing of arteries that supply blood to:  ¨ Your heart. This can cause a heart attack.  ¨ Your brain. This can cause a stroke.  ¨ Your kidneys. This can cause kidney failure.  · Stress on your heart muscle, which can cause heart failure.  What can I do to lower my risk?  · Work with your health care provider to make a hypertension prevention plan that works for you. Follow your plan and keep all follow-up visits as told by your health care provider.  · Learn how to check your blood pressure at home. Make sure that you know your personal target blood pressure, as told by your health care provider.  How is this treated?  In addition to diet and lifestyle changes, your health care provider may recommend medicines to help lower your blood pressure. You may need to try a few different medicines to find what works best for you. You also may need to take more than one medicine. Take over-the-counter and prescription medicines only as told by your health care provider.  Where to find support:  Your health care provider can help you prevent hypertension and help you keep your  blood pressure at a healthy level. Your local hospital or your community may also provide support services and prevention programs.  The American Heart Association offers an online support network at: http://supportnetwork.heart.org/high-blood-pressure  Where to find more information:  Learn more about hypertension from:  · National Heart, Lung, and Blood [place]: www.nhlbi.nih.gov/health/health-topics/topics/hbp  · Centers for Disease Control and Prevention: www.cdc.gov/bloodpressure  · American Academy of Family Physicians: http://familydoctor.org/familydoctor/en/diseases-conditions/high-blood-pressure.printerview.all.html  Learn more about the DASH diet from:  · National Heart, Lung, and Blood [place]: www.nhlbi.nih.gov/health/health-topics/topics/dash  Contact a health care provider if:  · You think you are having a reaction to medicines you have taken.  · You have recurrent headaches or feel dizzy.  · You have swelling in your ankles.  · You have trouble with your vision.  Summary  · Hypertension often does not cause any symptoms until blood pressure is very high. It is important to get your blood pressure checked regularly.  · Diet and lifestyle changes are the most important steps in preventing hypertension.  · By keeping your blood pressure in a healthy range, you can prevent complications like heart attack, heart failure, stroke, and kidney failure.  · Work with your health care provider to make a hypertension prevention plan that works for you.  This information is not intended to replace advice given to you by your health care provider. Make sure you discuss any questions you have with your health care provider.  Document Released: 01/01/2017 Document Revised: 08/28/2017 Document Reviewed: 08/28/2017  [employer] Interactive Patient Education © 2017 [employer]    Purchase a blood pressure monitor and check your pressures randomly at least once a day at variable times and log them. This will be very  helpful to show your primary care physician.  Cut out all caffeinated products.  Start exercising daily by walking at least one to 2 miles and then increase the distance and decrease the time. This will help reduce your blood pressure.  Follow up with your primary care physician within one week for recheck.

## 2018-05-18 NOTE — ED NOTES
Discharge instructions and rx given. Educated on when to return to ed and follow up with PCP. Pt verbalized understanding. Pt up with steady gait to lobby.

## 2018-05-19 ENCOUNTER — PATIENT OUTREACH (OUTPATIENT)
Dept: HEALTH INFORMATION MANAGEMENT | Facility: OTHER | Age: 38
End: 2018-05-19

## 2018-06-18 ENCOUNTER — OFFICE VISIT (OUTPATIENT)
Dept: MEDICAL GROUP | Facility: MEDICAL CENTER | Age: 38
End: 2018-06-18
Payer: COMMERCIAL

## 2018-06-18 VITALS
SYSTOLIC BLOOD PRESSURE: 142 MMHG | WEIGHT: 315 LBS | DIASTOLIC BLOOD PRESSURE: 102 MMHG | HEIGHT: 69 IN | BODY MASS INDEX: 46.65 KG/M2 | TEMPERATURE: 98.2 F | OXYGEN SATURATION: 98 % | HEART RATE: 98 BPM

## 2018-06-18 DIAGNOSIS — Z76.89 ENCOUNTER TO ESTABLISH CARE: ICD-10-CM

## 2018-06-18 DIAGNOSIS — E66.01 OBESITY, CLASS III, BMI 40-49.9 (MORBID OBESITY) (HCC): ICD-10-CM

## 2018-06-18 DIAGNOSIS — D64.9 ANEMIA, UNSPECIFIED TYPE: ICD-10-CM

## 2018-06-18 DIAGNOSIS — R00.2 PALPITATIONS: ICD-10-CM

## 2018-06-18 DIAGNOSIS — I10 ESSENTIAL HYPERTENSION: ICD-10-CM

## 2018-06-18 DIAGNOSIS — E87.6 HYPOKALEMIA: ICD-10-CM

## 2018-06-18 DIAGNOSIS — Z23 NEED FOR TDAP VACCINATION: ICD-10-CM

## 2018-06-18 DIAGNOSIS — Z00.00 HEALTH CARE MAINTENANCE: ICD-10-CM

## 2018-06-18 PROBLEM — K81.9 CHOLECYSTITIS: Status: RESOLVED | Noted: 2017-02-17 | Resolved: 2018-06-18

## 2018-06-18 PROCEDURE — 90471 IMMUNIZATION ADMIN: CPT | Performed by: INTERNAL MEDICINE

## 2018-06-18 PROCEDURE — 99204 OFFICE O/P NEW MOD 45 MIN: CPT | Mod: 25 | Performed by: INTERNAL MEDICINE

## 2018-06-18 PROCEDURE — 90715 TDAP VACCINE 7 YRS/> IM: CPT | Performed by: INTERNAL MEDICINE

## 2018-06-18 RX ORDER — LOSARTAN POTASSIUM 25 MG/1
25 TABLET ORAL DAILY
Qty: 90 TAB | Refills: 0 | Status: SHIPPED | OUTPATIENT
Start: 2018-06-18 | End: 2018-10-14 | Stop reason: SDUPTHER

## 2018-06-18 ASSESSMENT — PATIENT HEALTH QUESTIONNAIRE - PHQ9: CLINICAL INTERPRETATION OF PHQ2 SCORE: 0

## 2018-06-18 NOTE — PROGRESS NOTES
CHIEF COMPLAINT  Chief Complaint   Patient presents with   • Establish Care     NP   • Hypertension     BP Medication     HPI  Patient is a 37 y.o. male patient who presents today for the following     HYPERTENSION, palpitations, hypokalemia  The patient had an episode of palpitations on 5/17/18, when he was seen in the ER, note was reviewed with the following:  -EKG was negative, reviewed  -Chest x-ray: Negative  -CMP showed potassium 3.5  -CBC showed decreased MCV at 78  -Believe that palpitations may be due to high caffeine intake   -  he decreased to 1 cup of coffee per day, no caffeinated beverages    Meds: HCTZ, 12.5 mg QD, taking as prescribed.   Measuring BP at home: yes, it has been in 135/90'  Denies:  -  headaches, vision problems, tinnitus.                 -  chest pain/pressure, palpitations, irregular heart beats, exertional, dyspnea, peripheral edema.      - medication side effect: unusual fatigue, foot/leg swelling, cough.  Low salt diet: N  Diet: regular  Exercise: N  BMI: Body mass index is 48.57 kg/m².  FH of HTN: brother    Anemia  Found in labs when he was seen in the ER, as above.   Denies blood in the stool or change in stool caliber.   Renal function has been normal.    OBESITY, Body mass index is 48.57 kg/m².  Onset: after 1st surgery in 2004  Diet: regular  Exercise: N  No temperature intolerance. No change in hair/skin quality, BMs.   No HTN, buffalo hump, purple striae, flushing.  FH of obesity: parents,siblings x 2    Reviewed PMH, PSH, FH, SH, ALL, HCM/IMM, no changes  Reviewed MEDS, no changes c    Patient Active Problem List    Diagnosis Date Noted   • Essential hypertension 06/18/2018   • Obesity, Class III, BMI 40-49.9 (morbid obesity) (HCC) 06/18/2018   • Health care maintenance 06/18/2018   • Anemia 06/18/2018   • Palpitations 06/18/2018     CURRENT MEDICATIONS  Current Outpatient Prescriptions   Medication Sig Dispense Refill   • losartan (COZAAR) 25 MG Tab Take 1 Tab by mouth  "every day. 90 Tab 0   • ibuprofen (MOTRIN) 800 MG Tab Take 800 mg by mouth every 8 hours as needed.       No current facility-administered medications for this visit.      ALLERGIES  Allergies: Dilaudid [hydromorphone]  PAST MEDICAL HISTORY  Past Medical History:   Diagnosis Date   • Hypertension    • Obesity      SURGICAL HISTORY  He  has a past surgical history that includes federico by laparoscopy (2017) and fusion.  SOCIAL HISTORY  Social History   Substance Use Topics   • Smoking status: Former Smoker     Quit date: 1997   • Smokeless tobacco: Never Used   • Alcohol use No     Social History     Social History Narrative   • No narrative on file     FAMILY HISTORY  Family History   Problem Relation Age of Onset   • Lung Disease Mother      copd   • Hypertension Father    • Stroke Father    • Hyperlipidemia Father    • Hypertension Brother      Family Status   Relation Status   • Mother Alive   • Father    • Brother      ROS   Constitutional: Negative for fever, chills.  HENT: Negative for congestion, sore throat.  Eyes: Negative for blurred vision.   Respiratory: Negative for cough, shortness of breath.  Cardiovascular: As above  Gastrointestinal: Negative for heartburn, nausea, abdominal pain.   Genitourinary: Negative for dysuria.  Musculoskeletal: Negative for significant myalgias, back pain and joint pain.   Skin: Negative for rash and itching.   Neuro: Negative for dizziness, weakness and headaches.   Endo/Heme/Allergies: Does not bruise/bleed easily.   Psychiatric/Behavioral: Negative for depression, anxiety    PHYSICAL EXAM   Blood pressure 142/102, pulse 98, temperature 36.8 °C (98.2 °F), height 1.753 m (5' 9\"), weight (!) 149.2 kg (328 lb 14.8 oz), SpO2 98 %. Body mass index is 48.57 kg/m².  General:  NAD, morbid obesity.  HEENT:   NC/AT, PERRLA, EOMI, TMs are clear. Oropharyngeal mucosa is pink,  without lesions;  no cervical / supraclavicular  lymphadenopathy, no thyromegaly.  "   Cardiovascular: RRR.   No m/r/g. No carotid bruits .      Lungs:   CTAB, no w/r/r, no respiratory distress.  Abdomen: Soft, NT/ND + BS; unable to palpate liver/spleen due to obesity   Extremities:  2+ DP and radial pulses bilaterally.  No c/c/e.   Skin:  Warm, dry.  No erythema. No rash.   Neurologic: Alert & oriented x 3. No focal deficits.  Psychiatric:  Affect normal, mood normal, judgment normal.    LABS     Labs are reviewed and discussed with a patient  Lab Results   Component Value Date/Time    CHOLSTRLTOT 165 02/08/2018 08:09 AM    LDL 97 02/08/2018 08:09 AM    HDL 40 02/08/2018 08:09 AM    TRIGLYCERIDE 139 02/08/2018 08:09 AM       Lab Results   Component Value Date/Time    SODIUM 136 05/17/2018 07:55 PM    POTASSIUM 3.5 (L) 05/17/2018 07:55 PM    CHLORIDE 106 05/17/2018 07:55 PM    CO2 25 05/17/2018 07:55 PM    GLUCOSE 121 (H) 05/17/2018 07:55 PM    BUN 7 (L) 05/17/2018 07:55 PM    CREATININE 1.05 05/17/2018 07:55 PM     Lab Results   Component Value Date/Time    ALKPHOSPHAT 74 05/17/2018 07:55 PM    ASTSGOT 21 05/17/2018 07:55 PM    ALTSGPT 27 05/17/2018 07:55 PM    TBILIRUBIN 0.4 05/17/2018 07:55 PM      Lab Results   Component Value Date/Time    WBC 9.7 05/17/2018 07:55 PM    RBC 5.95 05/17/2018 07:55 PM    HEMOGLOBIN 15.2 05/17/2018 07:55 PM    HEMATOCRIT 46.7 05/17/2018 07:55 PM    MCV 78.5 (L) 05/17/2018 07:55 PM    MCH 25.5 (L) 05/17/2018 07:55 PM    MCHC 32.5 (L) 05/17/2018 07:55 PM    MPV 8.7 (L) 05/17/2018 07:55 PM    NEUTSPOLYS 57.30 05/17/2018 07:55 PM    LYMPHOCYTES 34.80 05/17/2018 07:55 PM    MONOCYTES 4.80 05/17/2018 07:55 PM    EOSINOPHILS 2.20 05/17/2018 07:55 PM    BASOPHILS 0.60 05/17/2018 07:55 PM      IMAGING     Reviewed the following imaging:    Chest x-ray, 5/17/18:  Minimal bibasilar infiltrate or atelectasis.         ASSESMENT AND PLAN        1. Essential hypertension  Improved, switch from HCTZ to losartan per pt request, as he had been noted here lower extremity swelling:  -  losartan (COZAAR) 25 MG Tab; Take 1 Tab by mouth every day.  Dispense: 90 Tab; Refill: 0  - COMP METABOLIC PANEL; Future    2. Palpitations  Improved after he decrease caffeine intake  - TSH; Future    3. Hypokalemia  Mild, follow-up labs    4. Anemia, unspecified type  Mild, follow-up labs  - IRON/TOTAL IRON BIND; Future  - OCCULT BLOOD FECES IMMUNOASSAY; Future  - CBC WITH DIFFERENTIAL; Future    5. Obesity, Class III, BMI 40-49.9 (morbid obesity) (HCC)  - Patient identified as having weight management issue.  Appropriate orders and counseling given.    6. Need for Tdap vaccination  - TDAP VACCINE =>8YO IM  Information was provided to the patient regarding the vaccine, including side effects. Vaccine was given by my medical assistant under my supervision.    7. Health care maintenance  Up to date this point    8. Encounter to establish care  Reviewed PMH, PSH, FH, SH, ALL, MEDS, HCM/IMM.     Counseling:   - Smoking:  Nonsmoker    Followup: Return in about 6 weeks (around 7/30/2018) for Short.    All questions are answered.    Please note that this dictation was created using voice recognition software, and that there might be errors of raul and possibly content.

## 2018-06-27 ENCOUNTER — HOSPITAL ENCOUNTER (OUTPATIENT)
Facility: MEDICAL CENTER | Age: 38
End: 2018-06-27
Attending: INTERNAL MEDICINE
Payer: COMMERCIAL

## 2018-06-27 PROCEDURE — 82274 ASSAY TEST FOR BLOOD FECAL: CPT

## 2018-06-28 ENCOUNTER — HOSPITAL ENCOUNTER (OUTPATIENT)
Facility: MEDICAL CENTER | Age: 38
End: 2018-06-28
Attending: INTERNAL MEDICINE
Payer: COMMERCIAL

## 2018-06-29 ENCOUNTER — HOSPITAL ENCOUNTER (OUTPATIENT)
Dept: LAB | Facility: MEDICAL CENTER | Age: 38
End: 2018-06-29
Attending: INTERNAL MEDICINE
Payer: COMMERCIAL

## 2018-06-29 DIAGNOSIS — D64.9 ANEMIA, UNSPECIFIED TYPE: ICD-10-CM

## 2018-06-29 DIAGNOSIS — I10 ESSENTIAL HYPERTENSION: ICD-10-CM

## 2018-06-29 DIAGNOSIS — R00.2 PALPITATIONS: ICD-10-CM

## 2018-06-29 LAB
ALBUMIN SERPL BCP-MCNC: 3.7 G/DL (ref 3.2–4.9)
ALBUMIN/GLOB SERPL: 1.1 G/DL
ALP SERPL-CCNC: 70 U/L (ref 30–99)
ALT SERPL-CCNC: 18 U/L (ref 2–50)
ANION GAP SERPL CALC-SCNC: 6 MMOL/L (ref 0–11.9)
AST SERPL-CCNC: 16 U/L (ref 12–45)
BASOPHILS # BLD AUTO: 1 % (ref 0–1.8)
BASOPHILS # BLD: 0.09 K/UL (ref 0–0.12)
BILIRUB SERPL-MCNC: 0.5 MG/DL (ref 0.1–1.5)
BUN SERPL-MCNC: 11 MG/DL (ref 8–22)
CALCIUM SERPL-MCNC: 9.2 MG/DL (ref 8.5–10.5)
CHLORIDE SERPL-SCNC: 105 MMOL/L (ref 96–112)
CO2 SERPL-SCNC: 27 MMOL/L (ref 20–33)
CREAT SERPL-MCNC: 0.84 MG/DL (ref 0.5–1.4)
EOSINOPHIL # BLD AUTO: 0.25 K/UL (ref 0–0.51)
EOSINOPHIL NFR BLD: 2.7 % (ref 0–6.9)
ERYTHROCYTE [DISTWIDTH] IN BLOOD BY AUTOMATED COUNT: 39.2 FL (ref 35.9–50)
GLOBULIN SER CALC-MCNC: 3.4 G/DL (ref 1.9–3.5)
GLUCOSE SERPL-MCNC: 94 MG/DL (ref 65–99)
HCT VFR BLD AUTO: 48.7 % (ref 42–52)
HGB BLD-MCNC: 15.5 G/DL (ref 14–18)
IMM GRANULOCYTES # BLD AUTO: 0.04 K/UL (ref 0–0.11)
IMM GRANULOCYTES NFR BLD AUTO: 0.4 % (ref 0–0.9)
IRON SATN MFR SERPL: 18 % (ref 15–55)
IRON SERPL-MCNC: 65 UG/DL (ref 50–180)
LYMPHOCYTES # BLD AUTO: 3.15 K/UL (ref 1–4.8)
LYMPHOCYTES NFR BLD: 33.6 % (ref 22–41)
MCH RBC QN AUTO: 25.5 PG (ref 27–33)
MCHC RBC AUTO-ENTMCNC: 31.8 G/DL (ref 33.7–35.3)
MCV RBC AUTO: 80.1 FL (ref 81.4–97.8)
MONOCYTES # BLD AUTO: 0.55 K/UL (ref 0–0.85)
MONOCYTES NFR BLD AUTO: 5.9 % (ref 0–13.4)
NEUTROPHILS # BLD AUTO: 5.29 K/UL (ref 1.82–7.42)
NEUTROPHILS NFR BLD: 56.4 % (ref 44–72)
NRBC # BLD AUTO: 0 K/UL
NRBC BLD-RTO: 0 /100 WBC
PLATELET # BLD AUTO: 303 K/UL (ref 164–446)
PMV BLD AUTO: 9.3 FL (ref 9–12.9)
POTASSIUM SERPL-SCNC: 4.6 MMOL/L (ref 3.6–5.5)
PROT SERPL-MCNC: 7.1 G/DL (ref 6–8.2)
RBC # BLD AUTO: 6.08 M/UL (ref 4.7–6.1)
SODIUM SERPL-SCNC: 138 MMOL/L (ref 135–145)
TIBC SERPL-MCNC: 363 UG/DL (ref 250–450)
TSH SERPL DL<=0.005 MIU/L-ACNC: 1.34 UIU/ML (ref 0.38–5.33)
WBC # BLD AUTO: 9.4 K/UL (ref 4.8–10.8)

## 2018-06-29 PROCEDURE — 80053 COMPREHEN METABOLIC PANEL: CPT

## 2018-06-29 PROCEDURE — 36415 COLL VENOUS BLD VENIPUNCTURE: CPT

## 2018-06-29 PROCEDURE — 83550 IRON BINDING TEST: CPT

## 2018-06-29 PROCEDURE — 85025 COMPLETE CBC W/AUTO DIFF WBC: CPT

## 2018-06-29 PROCEDURE — 84443 ASSAY THYROID STIM HORMONE: CPT

## 2018-06-29 PROCEDURE — 83540 ASSAY OF IRON: CPT

## 2018-06-30 DIAGNOSIS — D64.9 ANEMIA, UNSPECIFIED TYPE: ICD-10-CM

## 2018-07-02 ENCOUNTER — TELEPHONE (OUTPATIENT)
Dept: MEDICAL GROUP | Facility: MEDICAL CENTER | Age: 38
End: 2018-07-02

## 2018-07-02 DIAGNOSIS — D64.9 ANEMIA, UNSPECIFIED TYPE: ICD-10-CM

## 2018-07-02 NOTE — TELEPHONE ENCOUNTER
----- Message from Hernán Bowen M.D. sent at 6/30/2018  9:44 PM PDT -----  Please, notify pt that labs showed anemia, and I ordered FIT test,   Thank you,   Dr Mohr

## 2018-07-02 NOTE — TELEPHONE ENCOUNTER
Phone Number Called: [phone] (home)       Message: Patient informed of results and confirmed understanding.     Left Message for patient to call back: no

## 2018-07-04 LAB — HEMOCCULT STL QL IA: NEGATIVE

## 2018-07-05 ENCOUNTER — TELEPHONE (OUTPATIENT)
Dept: MEDICAL GROUP | Facility: MEDICAL CENTER | Age: 38
End: 2018-07-05

## 2018-07-05 NOTE — TELEPHONE ENCOUNTER
Phone Number Called: [phone] (home)       Message: Patient informed of msg and confirmed understanding.     Left Message for patient to call back: yes

## 2018-07-05 NOTE — TELEPHONE ENCOUNTER
----- Message from Hernán Bowen M.D. sent at 7/4/2018  9:32 PM PDT -----  Please, notify pt that test  For blood in stool was negative, thank you

## 2018-07-23 ENCOUNTER — OFFICE VISIT (OUTPATIENT)
Dept: URGENT CARE | Facility: CLINIC | Age: 38
End: 2018-07-23
Payer: COMMERCIAL

## 2018-07-23 VITALS
DIASTOLIC BLOOD PRESSURE: 94 MMHG | OXYGEN SATURATION: 98 % | RESPIRATION RATE: 14 BRPM | BODY MASS INDEX: 47.74 KG/M2 | TEMPERATURE: 98.7 F | WEIGHT: 315 LBS | SYSTOLIC BLOOD PRESSURE: 136 MMHG | HEART RATE: 82 BPM | HEIGHT: 68 IN

## 2018-07-23 DIAGNOSIS — J01.00 ACUTE MAXILLARY SINUSITIS, RECURRENCE NOT SPECIFIED: ICD-10-CM

## 2018-07-23 DIAGNOSIS — J40 BRONCHITIS: ICD-10-CM

## 2018-07-23 DIAGNOSIS — R05.9 COUGH: ICD-10-CM

## 2018-07-23 PROCEDURE — 99214 OFFICE O/P EST MOD 30 MIN: CPT | Performed by: NURSE PRACTITIONER

## 2018-07-23 RX ORDER — ALBUTEROL SULFATE 90 UG/1
2 AEROSOL, METERED RESPIRATORY (INHALATION) EVERY 6 HOURS PRN
Qty: 8.5 G | Refills: 0 | Status: SHIPPED | OUTPATIENT
Start: 2018-07-23 | End: 2019-01-17

## 2018-07-23 RX ORDER — AZITHROMYCIN 250 MG/1
TABLET, FILM COATED ORAL
Qty: 6 TAB | Refills: 0 | Status: SHIPPED | OUTPATIENT
Start: 2018-07-23 | End: 2019-01-17

## 2018-07-23 ASSESSMENT — ENCOUNTER SYMPTOMS
COUGH: 1
SPUTUM PRODUCTION: 1
FEVER: 0
CHILLS: 0

## 2018-07-23 NOTE — PROGRESS NOTES
"Subjective:      Noah Cervantes is a 38 y.o. male who presents with Cough (x1 week)    Past Medical History:   Diagnosis Date   • Hypertension    • Obesity      Social History     Social History   • Marital status: Single     Spouse name: N/A   • Number of children: N/A   • Years of education: N/A     Occupational History   • Not on file.     Social History Main Topics   • Smoking status: Former Smoker     Quit date: 2/16/1997   • Smokeless tobacco: Never Used   • Alcohol use No   • Drug use: No   • Sexual activity: Not on file     Other Topics Concern   • Not on file     Social History Narrative   • No narrative on file     Family History   Problem Relation Age of Onset   • Lung Disease Mother      copd   • Hypertension Father    • Stroke Father    • Hyperlipidemia Father    • Hypertension Brother    • Cancer Maternal Grandmother      breast       Allergies: Dilaudid [hydromorphone]    Patient is a 38-year-old male who presents with complaint of tight and dry cough with sinus pain and pressure. Symptoms have been ongoing over the last week. Denies any fever, aches, or chills. No shortness of breath at this time. Chest has been tight.          Cough   This is a new problem. The current episode started in the past 7 days. The problem has been unchanged. The problem occurs every few minutes. Pertinent negatives include no chills or fever. Nothing aggravates the symptoms. He has tried nothing for the symptoms. The treatment provided no relief.       Review of Systems   Constitutional: Negative for chills and fever.   Respiratory: Positive for cough and sputum production.    All other systems reviewed and are negative.         Objective:     /94   Pulse 82   Temp 37.1 °C (98.7 °F)   Resp 14   Ht 1.727 m (5' 8\")   Wt (!) 148.8 kg (328 lb)   SpO2 98%   BMI 49.87 kg/m²      Physical Exam   Constitutional: He is oriented to person, place, and time. He appears well-developed and well-nourished.   HENT:   Head: " Normocephalic.   Right Ear: External ear normal.   Left Ear: External ear normal.   Nose: Nose normal.   Mouth/Throat: No oropharyngeal exudate.   Tender over the left frontal and maxillary sinuses. Yellow postnasal drainage.   Eyes: Conjunctivae and EOM are normal. Pupils are equal, round, and reactive to light.   Neck: Normal range of motion. Neck supple.   Cardiovascular: Normal rate and regular rhythm.    Pulmonary/Chest: Effort normal.   Breath sounds are harsh, no rales, rhonchi, or wheezes   Musculoskeletal: Normal range of motion.   Neurological: He is alert and oriented to person, place, and time.   Skin: Skin is warm and dry. Capillary refill takes less than 2 seconds.   Psychiatric: He has a normal mood and affect. His behavior is normal. Judgment and thought content normal.   Vitals reviewed.              Assessment/Plan:     1. Cough  2. Sinusitis  3. Bronchitis  -zithromax  -albuterol  -humidifier  -push fluids  -refused RX for cough  -follow up otherwise for persistent or worsening of symptoms .

## 2018-10-14 DIAGNOSIS — I10 ESSENTIAL HYPERTENSION: ICD-10-CM

## 2018-10-15 RX ORDER — LOSARTAN POTASSIUM 25 MG/1
TABLET ORAL
Qty: 90 TAB | Refills: 0 | Status: SHIPPED | OUTPATIENT
Start: 2018-10-15 | End: 2020-01-02

## 2019-01-17 ENCOUNTER — OFFICE VISIT (OUTPATIENT)
Dept: MEDICAL GROUP | Facility: MEDICAL CENTER | Age: 39
End: 2019-01-17
Payer: COMMERCIAL

## 2019-01-17 VITALS
HEIGHT: 68 IN | OXYGEN SATURATION: 98 % | DIASTOLIC BLOOD PRESSURE: 90 MMHG | TEMPERATURE: 97.3 F | BODY MASS INDEX: 47.74 KG/M2 | RESPIRATION RATE: 16 BRPM | HEART RATE: 72 BPM | WEIGHT: 315 LBS | SYSTOLIC BLOOD PRESSURE: 116 MMHG

## 2019-01-17 DIAGNOSIS — E66.01 OBESITY, CLASS III, BMI 40-49.9 (MORBID OBESITY) (HCC): ICD-10-CM

## 2019-01-17 DIAGNOSIS — J06.9 ACUTE URI: ICD-10-CM

## 2019-01-17 DIAGNOSIS — I10 ESSENTIAL HYPERTENSION: ICD-10-CM

## 2019-01-17 PROCEDURE — 99214 OFFICE O/P EST MOD 30 MIN: CPT | Performed by: PHYSICIAN ASSISTANT

## 2019-01-17 RX ORDER — AZITHROMYCIN 250 MG/1
TABLET, FILM COATED ORAL
Qty: 6 TAB | Refills: 0 | Status: SHIPPED | OUTPATIENT
Start: 2019-01-17 | End: 2019-09-12

## 2019-01-17 NOTE — PROGRESS NOTES
"Subjective:   Noah Cervantes is a 38 y.o. male here today for nasal congestion for 10 days, hypertension and obesity.    Acute URI  This is a 38-year-old male who is here today to discuss a 10-day history of a cold.  Initially started with fever.  Sinus congestion and drainage.  Was coughing all day and night.  Thought he was getting better but in the past 4 days he has had a worsening cough that is usually in the daytime.  Denies coughing at nighttime.  Complains of postnasal drainage.  He had several family members that were sick.  Is currently not taking any medication.    Essential hypertension  Chronic history of hypertension.  Taking Cozaar daily.    Obesity, Class III, BMI 40-49.9 (morbid obesity) ([facility])  Has recently gone on a new diet with an application.  States the application is trying to retrain how he eats.  He is losing weight.  Initially weighed 330 and is currently 319 on his scale at home.      Current medicines (including changes today)  Current Outpatient Prescriptions   Medication Sig Dispense Refill   • azithromycin (ZITHROMAX) 250 MG Tab Take 2 pills by mouth on day one, take 1 pill by mouth on days 2-5 6 Tab 0   • losartan (COZAAR) 25 MG Tab TAKE 1 TABLET BY MOUTH EVERY DAY 90 Tab 0   • ibuprofen (MOTRIN) 800 MG Tab Take 800 mg by mouth every 8 hours as needed.       No current facility-administered medications for this visit.      He  has a past medical history of Hypertension and Obesity.    Social History and Family History were reviewed and updated.    ROS   No chest pain, no shortness of breath, no abdominal pain and all other systems were reviewed and are negative.       Objective:     Blood pressure 116/90, pulse 72, temperature 36.3 °C (97.3 °F), temperature source Temporal, resp. rate 16, height 1.728 m (5' 8.01\"), weight (!) 144.7 kg (319 lb), SpO2 98 %. Body mass index is 48.49 kg/m².   Physical Exam:  Constitutional: Alert, no distress.  Skin: Warm, dry, good turgor, no " rashes in visible areas.  Eye: Equal, round and reactive, conjunctiva clear, lids normal.  ENMT: Lips without lesions, good dentition, oropharynx clear.  Neck: Trachea midline, no masses.   Lymph: No cervical or supraclavicular lymphadenopathy  Respiratory: Unlabored respiratory effort, lungs clear to auscultation, no wheezes, no ronchi.  Cardiovascular: Normal S1, S2, no murmur, no edema.  Abdomen: Soft, non-tender, no masses.  Psych: Alert and oriented x3, normal affect and mood.        Assessment and Plan:   The following treatment plan was discussed    1. Acute URI  Acute, new onset condition.  Discussed with viral process.  Physical exam unremarkable.  Advised to take an antihistamine.  Given his slight maxillary tenderness advised also Sudafed PE with ibuprofen as needed.  Push fluids.  Possibly try some cough drops.  Follow-up with any worsening symptoms such as fever, shortness of breath or chest pain signifying possible pneumonia.  - azithromycin (ZITHROMAX) 250 MG Tab; Take 2 pills by mouth on day one, take 1 pill by mouth on days 2-5  Dispense: 6 Tab; Refill: 0    2. Essential hypertension  Chronic condition.  Controlled.  Continue Cozaar as directed.  Continue weight loss.    3. Obesity, Class III, BMI 40-49.9 (morbid obesity) (HCC)  Chronic condition.  Improved slightly with 3 weeks of a new dietary program.  Appears to be effective.  Continue program.  Will monitor.      Followup: Return if symptoms worsen or fail to improve.    Please note that this dictation was created using voice recognition software. I have made every reasonable attempt to correct obvious errors, but I expect that there are errors of grammar and possibly content that I did not discover before finalizing the note.

## 2019-01-17 NOTE — ASSESSMENT & PLAN NOTE
Has recently gone on a new diet with an application.  States the application is trying to retrain how he eats.  He is losing weight.  Initially weighed 330 and is currently 319 on his scale at home.

## 2019-01-17 NOTE — ASSESSMENT & PLAN NOTE
This is a 38-year-old male who is here today to discuss a 10-day history of a cold.  Initially started with fever.  Sinus congestion and drainage.  Was coughing all day and night.  Thought he was getting better but in the past 4 days he has had a worsening cough that is usually in the daytime.  Denies coughing at nighttime.  Complains of postnasal drainage.  He had several family members that were sick.  Is currently not taking any medication.

## 2019-02-21 ENCOUNTER — OFFICE VISIT (OUTPATIENT)
Dept: MEDICAL GROUP | Facility: MEDICAL CENTER | Age: 39
End: 2019-02-21
Payer: COMMERCIAL

## 2019-02-21 VITALS
OXYGEN SATURATION: 98 % | WEIGHT: 315 LBS | TEMPERATURE: 98.8 F | BODY MASS INDEX: 47.74 KG/M2 | SYSTOLIC BLOOD PRESSURE: 152 MMHG | HEIGHT: 68 IN | HEART RATE: 103 BPM | DIASTOLIC BLOOD PRESSURE: 102 MMHG

## 2019-02-21 DIAGNOSIS — D64.9 ANEMIA, UNSPECIFIED TYPE: ICD-10-CM

## 2019-02-21 DIAGNOSIS — I10 ESSENTIAL HYPERTENSION: ICD-10-CM

## 2019-02-21 DIAGNOSIS — Z00.00 HEALTH CARE MAINTENANCE: ICD-10-CM

## 2019-02-21 PROBLEM — E66.01 OBESITY, CLASS III, BMI 40-49.9 (MORBID OBESITY) (HCC): Status: RESOLVED | Noted: 2018-06-18 | Resolved: 2019-02-21

## 2019-02-21 PROBLEM — J06.9 ACUTE URI: Status: RESOLVED | Noted: 2019-01-17 | Resolved: 2019-02-21

## 2019-02-21 PROCEDURE — 99214 OFFICE O/P EST MOD 30 MIN: CPT | Performed by: INTERNAL MEDICINE

## 2019-02-21 RX ORDER — LOSARTAN POTASSIUM 50 MG/1
50 TABLET ORAL DAILY
Qty: 90 TAB | Refills: 1 | Status: SHIPPED | OUTPATIENT
Start: 2019-02-21 | End: 2019-09-01 | Stop reason: SDUPTHER

## 2019-02-21 ASSESSMENT — PATIENT HEALTH QUESTIONNAIRE - PHQ9: CLINICAL INTERPRETATION OF PHQ2 SCORE: 0

## 2019-02-21 NOTE — PROGRESS NOTES
CHIEF COMPLAINT  Chief Complaint   Patient presents with   • Medication Refill     Losartan   Hypertension    HPI  Patient is a 38 y.o. male patient who presents today for the following     HYPERTENSION  Uncontrolled.  Meds: Losartan 25 mg QD, taking as prescribed.   Measuring BP at home: yes, it has been in 150/90'  Denies: - headaches, vision problems, tinnitus.  - chest pain/pressure, palpitations, irregular heart beats, exertional, dyspnea, peripheral edema.              - medication side effect: unusual fatigue, foot/leg swelling, cough.  Low salt diet: N  Diet: regular  Exercise: N  BMI: 50  FH of HTN: brother     Anemia  Found in labs when he was seen in the ER, as above.   Denies blood in the stool or change in stool caliber.   Renal function has been normal.  Fit test was negative.     OBESITY, BMI 50  Onset: after 1st surgery in 2004  Diet: regular  Exercise: N  No temperature intolerance. No change in hair/skin quality, BMs.   No HTN, buffalo hump, purple striae, flushing.  FH of obesity: parents, siblings x 2    Reviewed PMH, PSH, FH, SH, ALL, HCM/IMM, no changes  Reviewed MEDS, no changes    Patient Active Problem List    Diagnosis Date Noted   • Acute URI 01/17/2019   • Essential hypertension 06/18/2018   • Obesity, Class III, BMI 40-49.9 (morbid obesity) ([facility]) 06/18/2018   • Health care maintenance 06/18/2018   • Anemia 06/18/2018   • Palpitations 06/18/2018     CURRENT MEDICATIONS  Current Outpatient Prescriptions   Medication Sig Dispense Refill   • losartan (COZAAR) 50 MG Tab Take 1 Tab by mouth every day. 90 Tab 1   • losartan (COZAAR) 25 MG Tab TAKE 1 TABLET BY MOUTH EVERY DAY 90 Tab 0   • azithromycin (ZITHROMAX) 250 MG Tab Take 2 pills by mouth on day one, take 1 pill by mouth on days 2-5 6 Tab 0   • ibuprofen (MOTRIN) 800 MG Tab Take 800 mg by mouth every 8 hours as needed.       No current facility-administered medications for this visit.      ALLERGIES  Allergies: Dilaudid  "[hydromorphone]  PAST MEDICAL HISTORY  Past Medical History:   Diagnosis Date   • Hypertension    • Obesity      SURGICAL HISTORY  He  has a past surgical history that includes federico by laparoscopy (2017) and fusion.  SOCIAL HISTORY  Social History   Substance Use Topics   • Smoking status: Former Smoker     Quit date: 1997   • Smokeless tobacco: Never Used   • Alcohol use No     Social History     Social History Narrative   • No narrative on file     FAMILY HISTORY  Family History   Problem Relation Age of Onset   • Lung Disease Mother         copd   • Hypertension Father    • Stroke Father    • Hyperlipidemia Father    • Hypertension Brother    • Cancer Maternal Grandmother         breast     Family Status   Relation Status   • Mo Alive   • Fa    • Bro (Not Specified)   • MGMo (Not Specified)       ROS   Constitutional: Negative for fever, chills.  HENT: Negative for congestion, sore throat.  Eyes: Negative for blurred vision.   Respiratory: Negative for cough, shortness of breath.  Cardiovascular: Negative for chest pain, palpitations.   Gastrointestinal: Negative for heartburn, nausea, abdominal pain.   Genitourinary: Negative for dysuria.  Musculoskeletal: Negative for significant myalgias, back pain and joint pain.   Skin: Negative for rash and itching.   Neuro: Negative for dizziness, weakness and headaches.   Endo/Heme/Allergies: Does not bruise/bleed easily.   Psychiatric/Behavioral: Negative for depression, anxiety    PHYSICAL EXAM   Blood pressure 152/102, pulse (!) 103, temperature 37.1 °C (98.8 °F), temperature source Temporal, height 1.727 m (5' 8.01\"), weight (!) 149.9 kg (330 lb 7.5 oz), SpO2 98 %. Body mass index is 50.23 kg/m².  General:  NAD, well appearing  HEENT:   NC/AT, PERRLA, EOMI, TMs are clear. Oropharyngeal mucosa is pink,  without lesions;  no cervical / supraclavicular  lymphadenopathy, no thyromegaly.    Cardiovascular: RRR.   No m/r/g. No carotid bruits .      Lungs: "   CTAB, no w/r/r, no respiratory distress.  Abdomen: Soft, NT/ND + BS; no suprapubic tenderness; no masses or hepatosplenomegaly.  Extremities:  2+ DP and radial pulses bilaterally.  No c/c/e.   Skin:  Warm, dry.  No erythema. No rash.   Neurologic: Alert & oriented x 3. CN II-XII grossly intact. Brachioradialis / knee DTR are 2/4, symmetric. Strength and sensation grossly intact.  No focal deficits.  Psychiatric:  Affect normal, mood normal, judgment normal.    LABS     Labs are reviewed and discussed with a patient  Lab Results   Component Value Date/Time    CHOLSTRLTOT 165 02/08/2018 08:09 AM    LDL 97 02/08/2018 08:09 AM    HDL 40 02/08/2018 08:09 AM    TRIGLYCERIDE 139 02/08/2018 08:09 AM       Lab Results   Component Value Date/Time    SODIUM 138 06/29/2018 09:29 AM    POTASSIUM 4.6 06/29/2018 09:29 AM    CHLORIDE 105 06/29/2018 09:29 AM    CO2 27 06/29/2018 09:29 AM    GLUCOSE 94 06/29/2018 09:29 AM    BUN 11 06/29/2018 09:29 AM    CREATININE 0.84 06/29/2018 09:29 AM     Lab Results   Component Value Date/Time    ALKPHOSPHAT 70 06/29/2018 09:29 AM    ASTSGOT 16 06/29/2018 09:29 AM    ALTSGPT 18 06/29/2018 09:29 AM    TBILIRUBIN 0.5 06/29/2018 09:29 AM      No results found for: HBA1C  No results found for: TSH  No results found for: FREET4    Lab Results   Component Value Date/Time    WBC 9.4 06/29/2018 09:29 AM    RBC 6.08 06/29/2018 09:29 AM    HEMOGLOBIN 15.5 06/29/2018 09:29 AM    HEMATOCRIT 48.7 06/29/2018 09:29 AM    MCV 80.1 (L) 06/29/2018 09:29 AM    MCH 25.5 (L) 06/29/2018 09:29 AM    MCHC 31.8 (L) 06/29/2018 09:29 AM    MPV 9.3 06/29/2018 09:29 AM    NEUTSPOLYS 56.40 06/29/2018 09:29 AM    LYMPHOCYTES 33.60 06/29/2018 09:29 AM    MONOCYTES 5.90 06/29/2018 09:29 AM    EOSINOPHILS 2.70 06/29/2018 09:29 AM    BASOPHILS 1.00 06/29/2018 09:29 AM       6/27/2018    Occult Blood, IA Negative     IMAGING     None    ASSESMENT AND PLAN        1. Essential hypertension  Uncontrolled:  -  increase losartan to  50 mg daily  - losartan (COZAAR) 50 MG Tab; Take 1 Tab by mouth every day.  Dispense: 90 Tab; Refill: 1  - REFERRAL TO [facility] PROGRAMS (HIP) Services Requested: Registered Dietitian Medicare Obesity Counseling, Physician Medical Weight Management Program; Reason for Referral? BMI>30; Reason for Visit: Overweight/Obesity  - Comp Metabolic Panel; Future  - CBC WITH DIFFERENTIAL; Future    2. Anemia, unspecified type  Follow-up labs  - VIT B12,  FOLIC ACID  - CBC WITH DIFFERENTIAL; Future    3. BMI 50.0-59.9, adult (HCC)  - REFERRAL TO [facility] IMPROVEMENT PROGRAMS (HIP) Services Requested: Registered Dietitian Medicare Obesity Counseling, Physician Medical Weight Management Program; Reason for Referral? BMI>30; Reason for Visit: Overweight/Obesity  - CBC WITH DIFFERENTIAL; Future  - OBESITY COUNSELING (No Charge): Patient identified as having weight management issue.  Appropriate orders and counseling given.    4. Health care maintenance  Advised immunizations X2    Counseling:   - Smoking:  Nonsmoker    Followup: Return in about 6 months (around 8/21/2019) for Long.    All questions are answered.    Please note that this dictation was created using voice recognition software, and that there might be errors of raul and possibly content.

## 2019-09-01 DIAGNOSIS — I10 ESSENTIAL HYPERTENSION: ICD-10-CM

## 2019-09-03 RX ORDER — LOSARTAN POTASSIUM 50 MG/1
TABLET ORAL
Qty: 30 TAB | Refills: 0 | Status: SHIPPED | OUTPATIENT
Start: 2019-09-03 | End: 2019-10-16

## 2019-09-12 ENCOUNTER — OFFICE VISIT (OUTPATIENT)
Dept: MEDICAL GROUP | Facility: MEDICAL CENTER | Age: 39
End: 2019-09-12
Payer: COMMERCIAL

## 2019-09-12 VITALS
DIASTOLIC BLOOD PRESSURE: 85 MMHG | RESPIRATION RATE: 14 BRPM | SYSTOLIC BLOOD PRESSURE: 128 MMHG | HEIGHT: 69 IN | BODY MASS INDEX: 46.65 KG/M2 | TEMPERATURE: 98.2 F | HEART RATE: 76 BPM | WEIGHT: 315 LBS | OXYGEN SATURATION: 97 %

## 2019-09-12 DIAGNOSIS — M77.9 TENDONITIS: ICD-10-CM

## 2019-09-12 DIAGNOSIS — M25.511 ACUTE PAIN OF RIGHT SHOULDER: ICD-10-CM

## 2019-09-12 PROCEDURE — 99214 OFFICE O/P EST MOD 30 MIN: CPT | Performed by: INTERNAL MEDICINE

## 2019-09-12 RX ORDER — PREDNISONE 10 MG/1
TABLET ORAL
Qty: 15 TAB | Refills: 0 | Status: SHIPPED | OUTPATIENT
Start: 2019-09-12 | End: 2019-09-18

## 2019-09-12 RX ORDER — MELOXICAM 15 MG/1
15 TABLET ORAL DAILY
Qty: 60 TAB | Refills: 0 | Status: SHIPPED | OUTPATIENT
Start: 2019-09-12 | End: 2020-09-15

## 2019-09-12 NOTE — PROGRESS NOTES
CHIEF COMPLAINT  Chief Complaint   Patient presents with   • Shoulder Pain   • Medication Refill     HPI  Patient is a 39 y.o. male patient who presents today for the following     Rt shoulder pain  - Onset: ~ 3 weeks ago  - Triger: typing/computer use  - located in: lower back  - intensity: max 4/10  - quality:  dull and sharp   - radiation:  no  - alleviating factors are:  rest, ibuprofen  -  exacerbating factors are:  activity  - accompanied:    - no numbness, weakness, tingling, fever, chills  - course: significant  - imaging: none  - treatment: as above    Reviewed PMH, PSH, FH, SH, ALL, HCM/IMM, no changes  Reviewed MEDS, no changes    Patient Active Problem List    Diagnosis Date Noted   • BMI 50.0-59.9, adult (HCC) 2019   • Essential hypertension 2018   • Health care maintenance 2018   • Anemia 2018   • Palpitations 2018     CURRENT MEDICATIONS  Current Outpatient Medications   Medication Sig Dispense Refill   • meloxicam (MOBIC) 15 MG tablet Take 1 Tab by mouth every day. 60 Tab 0   • predniSONE (DELTASONE) 10 MG Tab 5 po today then 4 po in am then 3 po in am then 2 po in am then 1 po in am then stop. 15 Tab 0   • losartan (COZAAR) 50 MG Tab TAKE 1 TABLET BY MOUTH EVERY DAY 30 Tab 0   • losartan (COZAAR) 25 MG Tab TAKE 1 TABLET BY MOUTH EVERY DAY 90 Tab 0   • ibuprofen (MOTRIN) 800 MG Tab Take 800 mg by mouth every 8 hours as needed.       No current facility-administered medications for this visit.      ALLERGIES  Allergies: Dilaudid [hydromorphone]  PAST MEDICAL HISTORY  Past Medical History:   Diagnosis Date   • Hypertension    • Obesity      SURGICAL HISTORY  He  has a past surgical history that includes federico by laparoscopy (2017) and fusion.  SOCIAL HISTORY  Social History     Tobacco Use   • Smoking status: Former Smoker     Last attempt to quit: 1997     Years since quittin.5   • Smokeless tobacco: Never Used   Substance Use Topics   • Alcohol use: No   •  "Drug use: No     Social History     Social History Narrative   • Not on file     FAMILY HISTORY  Family History   Problem Relation Age of Onset   • Lung Disease Mother         copd   • Hypertension Father    • Stroke Father    • Hyperlipidemia Father    • Hypertension Brother    • Cancer Maternal Grandmother         breast     Family Status   Relation Name Status   • Mo  Alive   • Fa     • Bro  (Not Specified)   • MGMo  (Not Specified)     ROS   Constitutional: Negative for fever, chills.  Respiratory: Negative for cough, shortness of breath.  Cardiovascular: Negative for chest pain, palpitations.   Gastrointestinal: Negative for heartburn, nausea, abdominal pain.   Musculoskeletal: as above.  Skin: Negative for rash.   Neuro: Negative for dizziness, weakness and headaches.   Endo/Heme/Allergies: Does not bruise/bleed easily.   Psychiatric/Behavioral: Negative for depression, anxiety    PHYSICAL EXAM   Blood Pressure 128/85   Pulse 76   Temperature 36.8 °C (98.2 °F)   Respiration 14   Height 1.753 m (5' 9\")   Weight (Abnormal) 149 kg (328 lb 7.8 oz)   Oxygen Saturation 97%   Body Mass Index 48.51 kg/m²   General:  NAD, well appearing  HEENT:   NC/AT, PERRLA, EOMI, TMs are clear. Oropharyngeal mucosa is pink,  without lesions;  no cervical / supraclavicular  lymphadenopathy, no thyromegaly.    Cardiovascular: RRR.   No m/r/g. No carotid bruits .      Lungs:   CTAB, no w/r/r, no respiratory distress.  Abdomen: Soft, NT/ND + BS; no suprapubic tenderness; no masses or hepatosplenomegaly.  Extremities:   RT shoulder: TTP over the proximal biceps tendons.  Range of motion is decreased due to pain.  No redness or swelling.  Skin:  Warm, dry.  No erythema. No rash.   Neurologic: Alert & oriented x 3. CN II-XII grossly intact. Brachioradialis / knee DTR are 2/4, symmetric. Strength and sensation grossly intact.  No focal deficits.  Psychiatric:  Affect normal, mood normal, judgment normal.    LABS     Labs are " reviewed and discussed with a patient  Lab Results   Component Value Date/Time    CHOLSTRLTOT 165 02/08/2018 08:09 AM    LDL 97 02/08/2018 08:09 AM    HDL 40 02/08/2018 08:09 AM    TRIGLYCERIDE 139 02/08/2018 08:09 AM       Lab Results   Component Value Date/Time    SODIUM 138 06/29/2018 09:29 AM    POTASSIUM 4.6 06/29/2018 09:29 AM    CHLORIDE 105 06/29/2018 09:29 AM    CO2 27 06/29/2018 09:29 AM    GLUCOSE 94 06/29/2018 09:29 AM    BUN 11 06/29/2018 09:29 AM    CREATININE 0.84 06/29/2018 09:29 AM     Lab Results   Component Value Date/Time    ALKPHOSPHAT 70 06/29/2018 09:29 AM    ASTSGOT 16 06/29/2018 09:29 AM    ALTSGPT 18 06/29/2018 09:29 AM    TBILIRUBIN 0.5 06/29/2018 09:29 AM     Lab Results   Component Value Date/Time    WBC 9.4 06/29/2018 09:29 AM    RBC 6.08 06/29/2018 09:29 AM    HEMOGLOBIN 15.5 06/29/2018 09:29 AM    HEMATOCRIT 48.7 06/29/2018 09:29 AM    MCV 80.1 (L) 06/29/2018 09:29 AM    MCH 25.5 (L) 06/29/2018 09:29 AM    MCHC 31.8 (L) 06/29/2018 09:29 AM    MPV 9.3 06/29/2018 09:29 AM    NEUTSPOLYS 56.40 06/29/2018 09:29 AM    LYMPHOCYTES 33.60 06/29/2018 09:29 AM    MONOCYTES 5.90 06/29/2018 09:29 AM    EOSINOPHILS 2.70 06/29/2018 09:29 AM    BASOPHILS 1.00 06/29/2018 09:29 AM      IMAGING     None    ASSESMENT AND PLAN        1. Acute pain of right shoulder  Overuse injury:   -Advised to decrease movements in the right arm/shoulder as much as possible, ice packs  -Next step would be local injection  - meloxicam (MOBIC) 15 MG tablet; Take 1 Tab by mouth every day.  Dispense: 60 Tab; Refill: 0  - predniSONE (DELTASONE) 10 MG Tab; 5 po today then 4 po in am then 3 po in am then 2 po in am then 1 po in am then stop.  Dispense: 15 Tab; Refill: 0    2. Tendonitis  As above  - meloxicam (MOBIC) 15 MG tablet; Take 1 Tab by mouth every day.  Dispense: 60 Tab; Refill: 0  - predniSONE (DELTASONE) 10 MG Tab; 5 po today then 4 po in am then 3 po in am then 2 po in am then 1 po in am then stop.  Dispense: 15  Tab; Refill: 0    3. BMI 45.0-49.9, adult (HCC)  - OBESITY COUNSELING (No Charge): Patient identified as having weight management issue.  Appropriate orders and counseling given.    Counseling:   - Smoking:  Nonsmoker    Followup: Return in about 4 weeks (around 10/10/2019) for Labs.    All questions are answered.    Please note that this dictation was created using voice recognition software, and that there might be errors of raul and possibly content.

## 2019-09-14 DIAGNOSIS — I10 ESSENTIAL HYPERTENSION: ICD-10-CM

## 2019-09-15 RX ORDER — LOSARTAN POTASSIUM 50 MG/1
TABLET ORAL
Qty: 90 TAB | Refills: 1 | Status: SHIPPED | OUTPATIENT
Start: 2019-09-15 | End: 2020-04-15

## 2019-10-15 ENCOUNTER — HOSPITAL ENCOUNTER (OUTPATIENT)
Dept: LAB | Facility: MEDICAL CENTER | Age: 39
End: 2019-10-15
Attending: INTERNAL MEDICINE
Payer: COMMERCIAL

## 2019-10-15 DIAGNOSIS — D64.9 ANEMIA, UNSPECIFIED TYPE: ICD-10-CM

## 2019-10-15 DIAGNOSIS — I10 ESSENTIAL HYPERTENSION: ICD-10-CM

## 2019-10-15 LAB
ALBUMIN SERPL BCP-MCNC: 4.1 G/DL (ref 3.2–4.9)
ALBUMIN/GLOB SERPL: 1.4 G/DL
ALP SERPL-CCNC: 85 U/L (ref 30–99)
ALT SERPL-CCNC: 23 U/L (ref 2–50)
ANION GAP SERPL CALC-SCNC: 6 MMOL/L (ref 0–11.9)
AST SERPL-CCNC: 15 U/L (ref 12–45)
BASOPHILS # BLD AUTO: 0.7 % (ref 0–1.8)
BASOPHILS # BLD: 0.06 K/UL (ref 0–0.12)
BILIRUB SERPL-MCNC: 0.7 MG/DL (ref 0.1–1.5)
BUN SERPL-MCNC: 12 MG/DL (ref 8–22)
CALCIUM SERPL-MCNC: 9.4 MG/DL (ref 8.5–10.5)
CHLORIDE SERPL-SCNC: 108 MMOL/L (ref 96–112)
CO2 SERPL-SCNC: 28 MMOL/L (ref 20–33)
CREAT SERPL-MCNC: 1.05 MG/DL (ref 0.5–1.4)
EOSINOPHIL # BLD AUTO: 0.25 K/UL (ref 0–0.51)
EOSINOPHIL NFR BLD: 2.9 % (ref 0–6.9)
ERYTHROCYTE [DISTWIDTH] IN BLOOD BY AUTOMATED COUNT: 40.1 FL (ref 35.9–50)
FASTING STATUS PATIENT QL REPORTED: NORMAL
FOLATE SERPL-MCNC: 19.4 NG/ML
GLOBULIN SER CALC-MCNC: 2.9 G/DL (ref 1.9–3.5)
GLUCOSE SERPL-MCNC: 92 MG/DL (ref 65–99)
HCT VFR BLD AUTO: 49.1 % (ref 42–52)
HGB BLD-MCNC: 15.7 G/DL (ref 14–18)
IMM GRANULOCYTES # BLD AUTO: 0.03 K/UL (ref 0–0.11)
IMM GRANULOCYTES NFR BLD AUTO: 0.4 % (ref 0–0.9)
LYMPHOCYTES # BLD AUTO: 2.84 K/UL (ref 1–4.8)
LYMPHOCYTES NFR BLD: 33.5 % (ref 22–41)
MCH RBC QN AUTO: 26 PG (ref 27–33)
MCHC RBC AUTO-ENTMCNC: 32 G/DL (ref 33.7–35.3)
MCV RBC AUTO: 81.4 FL (ref 81.4–97.8)
MONOCYTES # BLD AUTO: 0.59 K/UL (ref 0–0.85)
MONOCYTES NFR BLD AUTO: 7 % (ref 0–13.4)
NEUTROPHILS # BLD AUTO: 4.71 K/UL (ref 1.82–7.42)
NEUTROPHILS NFR BLD: 55.5 % (ref 44–72)
NRBC # BLD AUTO: 0 K/UL
NRBC BLD-RTO: 0 /100 WBC
PLATELET # BLD AUTO: 323 K/UL (ref 164–446)
PMV BLD AUTO: 9.2 FL (ref 9–12.9)
POTASSIUM SERPL-SCNC: 4.6 MMOL/L (ref 3.6–5.5)
PROT SERPL-MCNC: 7 G/DL (ref 6–8.2)
RBC # BLD AUTO: 6.03 M/UL (ref 4.7–6.1)
SODIUM SERPL-SCNC: 142 MMOL/L (ref 135–145)
VIT B12 SERPL-MCNC: 218 PG/ML (ref 211–911)
WBC # BLD AUTO: 8.5 K/UL (ref 4.8–10.8)

## 2019-10-15 PROCEDURE — 36415 COLL VENOUS BLD VENIPUNCTURE: CPT

## 2019-10-15 PROCEDURE — 82607 VITAMIN B-12: CPT

## 2019-10-15 PROCEDURE — 80053 COMPREHEN METABOLIC PANEL: CPT

## 2019-10-15 PROCEDURE — 82746 ASSAY OF FOLIC ACID SERUM: CPT

## 2019-10-15 PROCEDURE — 85025 COMPLETE CBC W/AUTO DIFF WBC: CPT

## 2019-10-16 ENCOUNTER — OFFICE VISIT (OUTPATIENT)
Dept: MEDICAL GROUP | Facility: MEDICAL CENTER | Age: 39
End: 2019-10-16
Payer: COMMERCIAL

## 2019-10-16 DIAGNOSIS — Z23 NEEDS FLU SHOT: ICD-10-CM

## 2019-10-16 DIAGNOSIS — D64.9 ANEMIA, UNSPECIFIED TYPE: ICD-10-CM

## 2019-10-16 DIAGNOSIS — E53.8 B12 DEFICIENCY: ICD-10-CM

## 2019-10-16 DIAGNOSIS — E66.01 OBESITY, CLASS III, BMI 40-49.9 (MORBID OBESITY) (HCC): ICD-10-CM

## 2019-10-16 DIAGNOSIS — I10 ESSENTIAL HYPERTENSION: ICD-10-CM

## 2019-10-16 PROCEDURE — 99214 OFFICE O/P EST MOD 30 MIN: CPT | Mod: 25 | Performed by: INTERNAL MEDICINE

## 2019-10-16 PROCEDURE — 90686 IIV4 VACC NO PRSV 0.5 ML IM: CPT | Performed by: INTERNAL MEDICINE

## 2019-10-16 PROCEDURE — 90471 IMMUNIZATION ADMIN: CPT | Performed by: INTERNAL MEDICINE

## 2019-10-16 RX ORDER — CHOLECALCIFEROL (VITAMIN D3) 25 MCG
1 TABLET,CHEWABLE ORAL
Qty: 90 TAB | Refills: 1 | Status: SHIPPED | OUTPATIENT
Start: 2019-10-16 | End: 2020-09-15

## 2019-10-16 NOTE — PROGRESS NOTES
CHIEF COMPLAINT  HTN, labs    HPI  Noah Cervantes is a 39 y.o. male who presents today for the following     Hypertension  Uncontrolled.  Meds: Losartan 50 mg QD, taking as prescribed.   Measuring BP at home: yes, it has been in 150/90'  Denies: - headaches, vision problems, tinnitus.  - chest pain/pressure, palpitations, irregular heart beats, exertional, dyspnea, peripheral edema.  - medication side effect: unusual fatigue, foot/leg swelling, cough.  Low salt diet: N  Diet: regular  Exercise: N  BMI: 49  FH of HTN: brother     Anemia, B12 deficiency  The patient had a slightly lobar RBC count;   -B12 came back borderline low.  He has been on regular diet.  Denies anorexia, numbness/tingling of extremities.     OBESITY, BMI 49  Onset: after 1st surgery in 2004  Diet: regular  Exercise: N  No temperature intolerance. No change in hair/skin quality, BMs.   No HTN, buffalo hump, purple striae, flushing.  FH of obesity: parents, siblings x 2     Reviewed PMH, PSH, FH, SH, ALL, HCM/IMM, no changes  Reviewed MEDS, no changes    Patient Active Problem List    Diagnosis Date Noted   • BMI 50.0-59.9, adult ([facility]) 02/21/2019   • Essential hypertension 06/18/2018   • Health care maintenance 06/18/2018   • Anemia 06/18/2018   • Palpitations 06/18/2018     CURRENT MEDICATIONS  Current Outpatient Medications   Medication Sig Dispense Refill   • losartan (COZAAR) 50 MG Tab TAKE 1 TABLET BY MOUTH EVERY DAY 90 Tab 1   • meloxicam (MOBIC) 15 MG tablet Take 1 Tab by mouth every day. 60 Tab 0   • losartan (COZAAR) 50 MG Tab TAKE 1 TABLET BY MOUTH EVERY DAY 30 Tab 0   • losartan (COZAAR) 25 MG Tab TAKE 1 TABLET BY MOUTH EVERY DAY 90 Tab 0   • ibuprofen (MOTRIN) 800 MG Tab Take 800 mg by mouth every 8 hours as needed.       No current facility-administered medications for this visit.      ALLERGIES  Allergies: Dilaudid [hydromorphone]  PAST MEDICAL HISTORY  Past Medical History:   Diagnosis Date   • Hypertension    • Obesity   "    SURGICAL HISTORY  He  has a past surgical history that includes federico by laparoscopy (2017) and fusion.  SOCIAL HISTORY  Social History     Tobacco Use   • Smoking status: Former Smoker     Last attempt to quit: 1997     Years since quittin.6   • Smokeless tobacco: Never Used   Substance Use Topics   • Alcohol use: No   • Drug use: No     Social History     Social History Narrative   • Not on file     FAMILY HISTORY  Family History   Problem Relation Age of Onset   • Lung Disease Mother         copd   • Hypertension Father    • Stroke Father    • Hyperlipidemia Father    • Hypertension Brother    • Cancer Maternal Grandmother         breast     Family Status   Relation Name Status   • Mo  Alive   • Fa     • Bro  (Not Specified)   • MGMo  (Not Specified)     ROS   Constitutional: Negative for fever, chills, fatigue.  HENT: Negative for congestion, sore throat.  Eyes: Negative for vision problems.   Respiratory: Negative for cough, shortness of breath.  Cardiovascular: Negative for chest pain, palpitations.   Gastrointestinal: Negative for heartburn, nausea, abdominal pain.   Genitourinary: Negative for dysuria.  Musculoskeletal: Negative for significant myalgia, back and joint pain.   Skin: Negative for rash.   Neuro: Negative for dizziness, weakness and headaches.   Endo/Heme/Allergies: As above.  Psychiatric/Behavioral: Negative for depression.    PHYSICAL EXAM   Blood Pressure 124/82   Pulse 70   Temperature 36.6 °C (97.9 °F)   Respiration 14   Height 1.753 m (5' 9\")   Weight (Abnormal) 152 kg (335 lb 1.6 oz)   Oxygen Saturation 98%   Body Mass Index 49.49 kg/m²   General:  NAD, well appearing  HEENT:   NC/AT, PERRLA, EOMI, TMs are clear. Oropharyngeal mucosa is pink,  without lesions;  no cervical / supraclavicular  lymphadenopathy, no thyromegaly.    Cardiovascular: RRR.   No m/r/g.       Lungs:   CTAB, no w/r/r, no respiratory distress.  Abdomen: Soft, NT/ND; no " hepatosplenomegaly.  Extremities:  2+ DP and radial pulses bilaterally.  No c/c/e.   Skin:  Warm, dry.  No erythema. No rash.   Neurologic: Alert & oriented x 3. CN II-XII grossly intact. No focal deficits.  Psychiatric:  Affect normal, mood normal, judgment normal.    Labs     Labs are reviewed and discussed with a patient  Lab Results   Component Value Date/Time    CHOLSTRLTOT 165 02/08/2018 08:09 AM    LDL 97 02/08/2018 08:09 AM    HDL 40 02/08/2018 08:09 AM    TRIGLYCERIDE 139 02/08/2018 08:09 AM       Lab Results   Component Value Date/Time    SODIUM 142 10/15/2019 07:47 AM    POTASSIUM 4.6 10/15/2019 07:47 AM    CHLORIDE 108 10/15/2019 07:47 AM    CO2 28 10/15/2019 07:47 AM    GLUCOSE 92 10/15/2019 07:47 AM    BUN 12 10/15/2019 07:47 AM    CREATININE 1.05 10/15/2019 07:47 AM     Lab Results   Component Value Date/Time    ALKPHOSPHAT 85 10/15/2019 07:47 AM    ASTSGOT 15 10/15/2019 07:47 AM    ALTSGPT 23 10/15/2019 07:47 AM    TBILIRUBIN 0.7 10/15/2019 07:47 AM      Lab Results   Component Value Date/Time    WBC 8.5 10/15/2019 07:47 AM    RBC 6.03 10/15/2019 07:47 AM    HEMOGLOBIN 15.7 10/15/2019 07:47 AM    HEMATOCRIT 49.1 10/15/2019 07:47 AM    MCV 81.4 10/15/2019 07:47 AM    MCH 26.0 (L) 10/15/2019 07:47 AM    MCHC 32.0 (L) 10/15/2019 07:47 AM    MPV 9.2 10/15/2019 07:47 AM    NEUTSPOLYS 55.50 10/15/2019 07:47 AM    LYMPHOCYTES 33.50 10/15/2019 07:47 AM    MONOCYTES 7.00 10/15/2019 07:47 AM    EOSINOPHILS 2.90 10/15/2019 07:47 AM    BASOPHILS 0.70 10/15/2019 07:47 AM      Imaging     None    Assessment and Plan     Noah Cervantes is a 39 y.o. male    1. Essential hypertension  Controlled, continue current treatment  - Comp Metabolic Panel; Future    2. Anemia, unspecified type  Start vitamin B12 supplement, follow-up labs  - Cyanocobalamin (B-12) 2500 MCG Tab; Take 1 Tab by mouth every day.  Dispense: 90 Tab; Refill: 1  - CBC WITH DIFFERENTIAL; Future  - IRON/TOTAL IRON BIND; Future  3. B12  deficiency  - Cyanocobalamin (B-12) 2500 MCG Tab; Take 1 Tab by mouth every day.  Dispense: 90 Tab; Refill: 1  - CBC WITH DIFFERENTIAL; Future  - VITAMIN B12; Future    4. Obesity, Class III, BMI 40-49.9 (morbid obesity) (HCC)  Referred to Dr. Kelly  - REFERRAL TO [facility]GR[facility] (HIP) Services Requested: [facility] Weight Management Program; Reason for Referral? BMI>25 and 1 or more co-morbidities, including DM2, HTN, steatosis/steatohepatitis/fatty liver, obstructive ...    5. Needs flu shot  - Influenza Vaccine Quad Injection (PF)  Information was provided to the patient regarding the vaccine, including side effects. Vaccine was given by my medical assistant under my supervision.    miguelnseling:   - Smoking:  Nonsmoker    Followup: Return in about 3 months (around 1/16/2020).    All questions are answered.    Please note that this dictation was created using voice recognition software, and that there might be errors of raul and possibly content.

## 2019-10-17 VITALS
HEART RATE: 70 BPM | SYSTOLIC BLOOD PRESSURE: 124 MMHG | RESPIRATION RATE: 14 BRPM | HEIGHT: 69 IN | DIASTOLIC BLOOD PRESSURE: 82 MMHG | WEIGHT: 315 LBS | OXYGEN SATURATION: 98 % | BODY MASS INDEX: 46.65 KG/M2 | TEMPERATURE: 97.9 F

## 2019-12-31 ENCOUNTER — HOSPITAL ENCOUNTER (OUTPATIENT)
Dept: LAB | Facility: MEDICAL CENTER | Age: 39
End: 2019-12-31
Attending: INTERNAL MEDICINE
Payer: COMMERCIAL

## 2019-12-31 DIAGNOSIS — E53.8 B12 DEFICIENCY: ICD-10-CM

## 2019-12-31 DIAGNOSIS — I10 ESSENTIAL HYPERTENSION: ICD-10-CM

## 2019-12-31 DIAGNOSIS — D64.9 ANEMIA, UNSPECIFIED TYPE: ICD-10-CM

## 2019-12-31 LAB
ALBUMIN SERPL BCP-MCNC: 4.1 G/DL (ref 3.2–4.9)
ALBUMIN/GLOB SERPL: 1.3 G/DL
ALP SERPL-CCNC: 75 U/L (ref 30–99)
ALT SERPL-CCNC: 25 U/L (ref 2–50)
ANION GAP SERPL CALC-SCNC: 7 MMOL/L (ref 0–11.9)
AST SERPL-CCNC: 17 U/L (ref 12–45)
BASOPHILS # BLD AUTO: 1.2 % (ref 0–1.8)
BASOPHILS # BLD: 0.1 K/UL (ref 0–0.12)
BILIRUB SERPL-MCNC: 0.6 MG/DL (ref 0.1–1.5)
BUN SERPL-MCNC: 17 MG/DL (ref 8–22)
CALCIUM SERPL-MCNC: 9 MG/DL (ref 8.5–10.5)
CHLORIDE SERPL-SCNC: 107 MMOL/L (ref 96–112)
CO2 SERPL-SCNC: 24 MMOL/L (ref 20–33)
CREAT SERPL-MCNC: 1.02 MG/DL (ref 0.5–1.4)
EOSINOPHIL # BLD AUTO: 0.29 K/UL (ref 0–0.51)
EOSINOPHIL NFR BLD: 3.3 % (ref 0–6.9)
ERYTHROCYTE [DISTWIDTH] IN BLOOD BY AUTOMATED COUNT: 40.8 FL (ref 35.9–50)
GLOBULIN SER CALC-MCNC: 3.1 G/DL (ref 1.9–3.5)
GLUCOSE SERPL-MCNC: 95 MG/DL (ref 65–99)
HCT VFR BLD AUTO: 49.7 % (ref 42–52)
HGB BLD-MCNC: 15.8 G/DL (ref 14–18)
IMM GRANULOCYTES # BLD AUTO: 0.03 K/UL (ref 0–0.11)
IMM GRANULOCYTES NFR BLD AUTO: 0.3 % (ref 0–0.9)
IRON SATN MFR SERPL: 21 % (ref 15–55)
IRON SERPL-MCNC: 70 UG/DL (ref 50–180)
LYMPHOCYTES # BLD AUTO: 3.56 K/UL (ref 1–4.8)
LYMPHOCYTES NFR BLD: 41 % (ref 22–41)
MCH RBC QN AUTO: 25.9 PG (ref 27–33)
MCHC RBC AUTO-ENTMCNC: 31.8 G/DL (ref 33.7–35.3)
MCV RBC AUTO: 81.5 FL (ref 81.4–97.8)
MONOCYTES # BLD AUTO: 0.57 K/UL (ref 0–0.85)
MONOCYTES NFR BLD AUTO: 6.6 % (ref 0–13.4)
NEUTROPHILS # BLD AUTO: 4.13 K/UL (ref 1.82–7.42)
NEUTROPHILS NFR BLD: 47.6 % (ref 44–72)
NRBC # BLD AUTO: 0 K/UL
NRBC BLD-RTO: 0 /100 WBC
PLATELET # BLD AUTO: 296 K/UL (ref 164–446)
PMV BLD AUTO: 9.3 FL (ref 9–12.9)
POTASSIUM SERPL-SCNC: 4.3 MMOL/L (ref 3.6–5.5)
PROT SERPL-MCNC: 7.2 G/DL (ref 6–8.2)
RBC # BLD AUTO: 6.1 M/UL (ref 4.7–6.1)
SODIUM SERPL-SCNC: 138 MMOL/L (ref 135–145)
TIBC SERPL-MCNC: 340 UG/DL (ref 250–450)
VIT B12 SERPL-MCNC: 614 PG/ML (ref 211–911)
WBC # BLD AUTO: 8.7 K/UL (ref 4.8–10.8)

## 2019-12-31 PROCEDURE — 83540 ASSAY OF IRON: CPT

## 2019-12-31 PROCEDURE — 83550 IRON BINDING TEST: CPT

## 2019-12-31 PROCEDURE — 80053 COMPREHEN METABOLIC PANEL: CPT

## 2019-12-31 PROCEDURE — 85025 COMPLETE CBC W/AUTO DIFF WBC: CPT

## 2019-12-31 PROCEDURE — 82607 VITAMIN B-12: CPT

## 2019-12-31 PROCEDURE — 36415 COLL VENOUS BLD VENIPUNCTURE: CPT

## 2020-01-02 ENCOUNTER — OFFICE VISIT (OUTPATIENT)
Dept: MEDICAL GROUP | Facility: MEDICAL CENTER | Age: 40
End: 2020-01-02
Payer: COMMERCIAL

## 2020-01-02 VITALS
WEIGHT: 315 LBS | HEIGHT: 69 IN | BODY MASS INDEX: 46.65 KG/M2 | TEMPERATURE: 98.4 F | OXYGEN SATURATION: 97 % | HEART RATE: 87 BPM | SYSTOLIC BLOOD PRESSURE: 126 MMHG | DIASTOLIC BLOOD PRESSURE: 72 MMHG | RESPIRATION RATE: 12 BRPM

## 2020-01-02 DIAGNOSIS — R00.2 PALPITATIONS: ICD-10-CM

## 2020-01-02 DIAGNOSIS — D64.9 ANEMIA, UNSPECIFIED TYPE: ICD-10-CM

## 2020-01-02 DIAGNOSIS — Z00.00 HEALTH CARE MAINTENANCE: ICD-10-CM

## 2020-01-02 DIAGNOSIS — I10 ESSENTIAL HYPERTENSION: ICD-10-CM

## 2020-01-02 DIAGNOSIS — E53.8 B12 DEFICIENCY: ICD-10-CM

## 2020-01-02 PROCEDURE — 99214 OFFICE O/P EST MOD 30 MIN: CPT | Performed by: INTERNAL MEDICINE

## 2020-01-02 ASSESSMENT — PATIENT HEALTH QUESTIONNAIRE - PHQ9: CLINICAL INTERPRETATION OF PHQ2 SCORE: 0

## 2020-01-02 NOTE — PROGRESS NOTES
CHIEF COMPLAINT  HTN, labs    HPI  Noah Cervantes is a 39 y.o. male who presents today for the following     Hypertension, palpitations  Uncontrolled.  Meds: Losartan 50 mg QD, taking as prescribed.   Measuring BP at home: yes, it has been in 150/90'  Denies: - headaches, vision problems, tinnitus.  - chest pain/pressure, palpitations, irregular heart beats, exertional, dyspnea, peripheral edema.  - medication side effect: unusual fatigue, foot/leg swelling, cough.  Low salt diet: N  Diet: regular  Exercise: N  BMI: 50  FH of HTN: brother     Anemia, B12 deficiency  The patient had a slightly lobar RBC count;   -B12 came back borderline low.  He has been on regular diet.  Denies anorexia, numbness/tingling of extremities.     OBESITY,   Onset: after 1st surgery in 2004  Diet: regular  Exercise: N  No temperature intolerance. No change in hair/skin quality, BMs.   No HTN, buffalo hump, purple striae, flushing.  FH of obesity: parents, siblings x 2    Reviewed PMH, PSH, FH, SH, ALL, HCM/IMM, no changes  Reviewed MEDS, no changes    Patient Active Problem List    Diagnosis Date Noted   • BMI 50.0-59.9, adult ([facility]) 02/21/2019   • Essential hypertension 06/18/2018   • Health care maintenance 06/18/2018   • Anemia 06/18/2018   • Palpitations 06/18/2018     CURRENT MEDICATIONS  Current Outpatient Medications   Medication Sig Dispense Refill   • Cyanocobalamin (B-12) 2500 MCG Tab Take 1 Tab by mouth every day. 90 Tab 1   • losartan (COZAAR) 50 MG Tab TAKE 1 TABLET BY MOUTH EVERY DAY 90 Tab 1   • meloxicam (MOBIC) 15 MG tablet Take 1 Tab by mouth every day. 60 Tab 0   • losartan (COZAAR) 25 MG Tab TAKE 1 TABLET BY MOUTH EVERY DAY 90 Tab 0   • ibuprofen (MOTRIN) 800 MG Tab Take 800 mg by mouth every 8 hours as needed.       No current facility-administered medications for this visit.      ALLERGIES  Allergies: Dilaudid [hydromorphone]  PAST MEDICAL HISTORY  Past Medical History:   Diagnosis Date   • Hypertension    •  "Obesity      SURGICAL HISTORY  He  has a past surgical history that includes federico by laparoscopy (2017) and fusion.  SOCIAL HISTORY  Social History     Tobacco Use   • Smoking status: Former Smoker     Last attempt to quit: 1997     Years since quittin.8   • Smokeless tobacco: Never Used   Substance Use Topics   • Alcohol use: No   • Drug use: No     Social History     Patient does not qualify to have social determinant information on file (likely too young).   Social History Narrative   • Not on file     FAMILY HISTORY  Family History   Problem Relation Age of Onset   • Lung Disease Mother         copd   • Hypertension Father    • Stroke Father    • Hyperlipidemia Father    • Hypertension Brother    • Cancer Maternal Grandmother         breast     Family Status   Relation Name Status   • Mo  Alive   • Fa     • Bro  (Not Specified)   • MGMo  (Not Specified)     ROS   Constitutional: Negative for fever, chills, fatigue.  HENT: Negative for congestion, sore throat.  Eyes: Negative for vision problems.   Respiratory: Negative for cough, shortness of breath.  Cardiovascular: Negative for chest pain, palpitations.   Gastrointestinal: Negative for heartburn, nausea, abdominal pain.   Genitourinary: Negative for dysuria.  Musculoskeletal: Negative for significant myalgia, back and joint pain.   Skin: Negative for rash.   Neuro: Negative for dizziness, weakness and headaches.   Endo/Heme/Allergies: Does not bruise/bleed easily.   Psychiatric/Behavioral: Negative for depression.    PHYSICAL EXAM   Blood Pressure 126/72   Pulse 87   Temperature 36.9 °C (98.4 °F)   Respiration 12   Height 1.753 m (5' 9\")   Weight (Abnormal) 154 kg (339 lb 8.1 oz)   Oxygen Saturation 97%   Body Mass Index 50.14 kg/m²   General:  NAD, well appearing  HEENT:   NC/AT, PERRLA, EOMI, TMs are clear. Oropharyngeal mucosa is pink,  without lesions;  no cervical / supraclavicular  lymphadenopathy, no thyromegaly.  "   Cardiovascular: RRR.   No m/r/g.       Lungs:   CTAB, no w/r/r, no respiratory distress.  Abdomen: Soft, NT/ND; unable to palpate liver/spleen due to WT.  Extremities:  2+ DP and radial pulses bilaterally.  No c/c/e.   Skin:  Warm, dry.  No erythema. No rash.   Neurologic: Alert & oriented x 3. CN II-XII grossly intact. No focal deficits.  Psychiatric:  Affect normal, mood normal, judgment normal.    Labs     Labs are reviewed and discussed with a patient  Lab Results   Component Value Date/Time    CHOLSTRLTOT 165 02/08/2018 08:09 AM    LDL 97 02/08/2018 08:09 AM    HDL 40 02/08/2018 08:09 AM    TRIGLYCERIDE 139 02/08/2018 08:09 AM       Lab Results   Component Value Date/Time    SODIUM 138 12/31/2019 07:19 AM    POTASSIUM 4.3 12/31/2019 07:19 AM    CHLORIDE 107 12/31/2019 07:19 AM    CO2 24 12/31/2019 07:19 AM    GLUCOSE 95 12/31/2019 07:19 AM    BUN 17 12/31/2019 07:19 AM    CREATININE 1.02 12/31/2019 07:19 AM     Lab Results   Component Value Date/Time    ALKPHOSPHAT 75 12/31/2019 07:19 AM    ASTSGOT 17 12/31/2019 07:19 AM    ALTSGPT 25 12/31/2019 07:19 AM    TBILIRUBIN 0.6 12/31/2019 07:19 AM      Lab Results   Component Value Date/Time    WBC 8.7 12/31/2019 07:19 AM    RBC 6.10 12/31/2019 07:19 AM    HEMOGLOBIN 15.8 12/31/2019 07:19 AM    HEMATOCRIT 49.7 12/31/2019 07:19 AM    MCV 81.5 12/31/2019 07:19 AM    MCH 25.9 (L) 12/31/2019 07:19 AM    MCHC 31.8 (L) 12/31/2019 07:19 AM    MPV 9.3 12/31/2019 07:19 AM    NEUTSPOLYS 47.60 12/31/2019 07:19 AM    LYMPHOCYTES 41.00 12/31/2019 07:19 AM    MONOCYTES 6.60 12/31/2019 07:19 AM    EOSINOPHILS 3.30 12/31/2019 07:19 AM    BASOPHILS 1.20 12/31/2019 07:19 AM      Imaging     None    Assessment and Plan     Noah Cervantes is a 39 y.o. male    1. Essential hypertension  - Comp Metabolic Panel; Future  2. Palpitations  Controlled, continue with current treatment.    3. Anemia, unspecified type  Improved, continue current treatment  - CBC WITH DIFFERENTIAL;  Future  - VITAMIN B12; Future  4. B12 deficiency  - CBC WITH DIFFERENTIAL; Future  - VITAMIN B12; Future    5. BMI 50.0-59.9, adult (HCC)  - OBESITY COUNSELING (No Charge): Patient identified as having weight management issue.  Appropriate orders and counseling given.    6. Health care maintenance  UTD    Followup: Return in about 3 months (around 4/2/2020) for Labs.    All questions are answered.    Please note that this dictation was created using voice recognition software, and that there might be errors of raul and possibly content.

## 2020-03-25 ENCOUNTER — HOSPITAL ENCOUNTER (OUTPATIENT)
Dept: LAB | Facility: MEDICAL CENTER | Age: 40
End: 2020-03-25
Attending: INTERNAL MEDICINE
Payer: COMMERCIAL

## 2020-03-25 DIAGNOSIS — E53.8 B12 DEFICIENCY: ICD-10-CM

## 2020-03-25 DIAGNOSIS — D64.9 ANEMIA, UNSPECIFIED TYPE: ICD-10-CM

## 2020-03-25 DIAGNOSIS — I10 ESSENTIAL HYPERTENSION: ICD-10-CM

## 2020-03-25 LAB
ALBUMIN SERPL BCP-MCNC: 4.3 G/DL (ref 3.2–4.9)
ALBUMIN/GLOB SERPL: 1.4 G/DL
ALP SERPL-CCNC: 97 U/L (ref 30–99)
ALT SERPL-CCNC: 23 U/L (ref 2–50)
ANION GAP SERPL CALC-SCNC: 9 MMOL/L (ref 7–16)
AST SERPL-CCNC: 14 U/L (ref 12–45)
BASOPHILS # BLD AUTO: 0.8 % (ref 0–1.8)
BASOPHILS # BLD: 0.06 K/UL (ref 0–0.12)
BILIRUB SERPL-MCNC: 0.5 MG/DL (ref 0.1–1.5)
BUN SERPL-MCNC: 13 MG/DL (ref 8–22)
CALCIUM SERPL-MCNC: 9.1 MG/DL (ref 8.5–10.5)
CHLORIDE SERPL-SCNC: 101 MMOL/L (ref 96–112)
CO2 SERPL-SCNC: 26 MMOL/L (ref 20–33)
CREAT SERPL-MCNC: 1.08 MG/DL (ref 0.5–1.4)
EOSINOPHIL # BLD AUTO: 0.19 K/UL (ref 0–0.51)
EOSINOPHIL NFR BLD: 2.6 % (ref 0–6.9)
ERYTHROCYTE [DISTWIDTH] IN BLOOD BY AUTOMATED COUNT: 39.5 FL (ref 35.9–50)
FASTING STATUS PATIENT QL REPORTED: NORMAL
GLOBULIN SER CALC-MCNC: 3.1 G/DL (ref 1.9–3.5)
GLUCOSE SERPL-MCNC: 92 MG/DL (ref 65–99)
HCT VFR BLD AUTO: 49.4 % (ref 42–52)
HGB BLD-MCNC: 15.8 G/DL (ref 14–18)
IMM GRANULOCYTES # BLD AUTO: 0.02 K/UL (ref 0–0.11)
IMM GRANULOCYTES NFR BLD AUTO: 0.3 % (ref 0–0.9)
LYMPHOCYTES # BLD AUTO: 2.59 K/UL (ref 1–4.8)
LYMPHOCYTES NFR BLD: 35.7 % (ref 22–41)
MCH RBC QN AUTO: 25.8 PG (ref 27–33)
MCHC RBC AUTO-ENTMCNC: 32 G/DL (ref 33.7–35.3)
MCV RBC AUTO: 80.6 FL (ref 81.4–97.8)
MONOCYTES # BLD AUTO: 0.55 K/UL (ref 0–0.85)
MONOCYTES NFR BLD AUTO: 7.6 % (ref 0–13.4)
NEUTROPHILS # BLD AUTO: 3.85 K/UL (ref 1.82–7.42)
NEUTROPHILS NFR BLD: 53 % (ref 44–72)
NRBC # BLD AUTO: 0 K/UL
NRBC BLD-RTO: 0 /100 WBC
PLATELET # BLD AUTO: 286 K/UL (ref 164–446)
PMV BLD AUTO: 9.4 FL (ref 9–12.9)
POTASSIUM SERPL-SCNC: 4 MMOL/L (ref 3.6–5.5)
PROT SERPL-MCNC: 7.4 G/DL (ref 6–8.2)
RBC # BLD AUTO: 6.13 M/UL (ref 4.7–6.1)
SODIUM SERPL-SCNC: 136 MMOL/L (ref 135–145)
VIT B12 SERPL-MCNC: 1363 PG/ML (ref 211–911)
WBC # BLD AUTO: 7.3 K/UL (ref 4.8–10.8)

## 2020-03-25 PROCEDURE — 36415 COLL VENOUS BLD VENIPUNCTURE: CPT

## 2020-03-25 PROCEDURE — 82607 VITAMIN B-12: CPT

## 2020-03-25 PROCEDURE — 80053 COMPREHEN METABOLIC PANEL: CPT

## 2020-03-25 PROCEDURE — 85025 COMPLETE CBC W/AUTO DIFF WBC: CPT

## 2020-04-15 DIAGNOSIS — I10 ESSENTIAL HYPERTENSION: ICD-10-CM

## 2020-04-15 RX ORDER — LOSARTAN POTASSIUM 50 MG/1
TABLET ORAL
Qty: 90 TAB | Refills: 0 | Status: SHIPPED | OUTPATIENT
Start: 2020-04-15 | End: 2020-07-13

## 2020-07-13 DIAGNOSIS — I10 ESSENTIAL HYPERTENSION: ICD-10-CM

## 2020-07-13 RX ORDER — LOSARTAN POTASSIUM 50 MG/1
TABLET ORAL
Qty: 30 TAB | Refills: 0 | Status: SHIPPED | OUTPATIENT
Start: 2020-07-13 | End: 2020-09-15 | Stop reason: SDUPTHER

## 2020-09-15 ENCOUNTER — OFFICE VISIT (OUTPATIENT)
Dept: MEDICAL GROUP | Age: 40
End: 2020-09-15
Payer: COMMERCIAL

## 2020-09-15 VITALS
OXYGEN SATURATION: 98 % | TEMPERATURE: 98.4 F | WEIGHT: 315 LBS | SYSTOLIC BLOOD PRESSURE: 124 MMHG | DIASTOLIC BLOOD PRESSURE: 84 MMHG | HEART RATE: 81 BPM | BODY MASS INDEX: 46.65 KG/M2 | HEIGHT: 69 IN

## 2020-09-15 DIAGNOSIS — E53.8 B12 DEFICIENCY: ICD-10-CM

## 2020-09-15 DIAGNOSIS — I10 ESSENTIAL HYPERTENSION: ICD-10-CM

## 2020-09-15 DIAGNOSIS — Z71.85 IMMUNIZATION COUNSELING: ICD-10-CM

## 2020-09-15 DIAGNOSIS — Z00.00 HEALTH CARE MAINTENANCE: ICD-10-CM

## 2020-09-15 DIAGNOSIS — D64.9 ANEMIA, UNSPECIFIED TYPE: ICD-10-CM

## 2020-09-15 DIAGNOSIS — Z00.00 ANNUAL PHYSICAL EXAM: ICD-10-CM

## 2020-09-15 PROCEDURE — 99213 OFFICE O/P EST LOW 20 MIN: CPT | Mod: 25 | Performed by: INTERNAL MEDICINE

## 2020-09-15 PROCEDURE — 99396 PREV VISIT EST AGE 40-64: CPT | Performed by: INTERNAL MEDICINE

## 2020-09-15 RX ORDER — LOSARTAN POTASSIUM 50 MG/1
50 TABLET ORAL
Qty: 90 TAB | Refills: 0 | Status: SHIPPED | OUTPATIENT
Start: 2020-09-15 | End: 2020-12-11 | Stop reason: SDUPTHER

## 2020-09-15 RX ORDER — PHENTERMINE HYDROCHLORIDE 37.5 MG/1
37.5 TABLET ORAL
Qty: 30 TAB | Refills: 0 | Status: SHIPPED | OUTPATIENT
Start: 2020-09-15 | End: 2020-10-13 | Stop reason: SDUPTHER

## 2020-09-15 ASSESSMENT — FIBROSIS 4 INDEX: FIB4 SCORE: 0.42

## 2020-09-15 NOTE — PROGRESS NOTES
CHIEF COMPLAINT  Chief Complaint   Patient presents with   • Medication Refill   HTN    HPI  Noah Cervantes is a 40 y.o. male who presents today for the following     HCM  Recommendations/advised:  Regular exercise at least 4 days a week  Diet: advised balanced   Dental exam at least 1-2 times per year  Sunscreen use.    Immunization counseling:  TdaP:  UTD  Influenza: unavailable in office    Hypertension  Meds: Losartan 50 mg QD, taking as prescribed.   Denies: - headaches, vision problems, tinnitus.  - chest pain/pressure, palpitations, irregular heart beats, exertional, dyspnea, peripheral edema.  - medication side effect: unusual fatigue, foot/leg swelling, cough.  Low salt diet: N  Diet: regular  Exercise: N  BMI: 51  FH of HTN: brother     Anemia, B12 deficiency  The patient had a slightly lobar RBC count;   -B12 came back borderline low.  He has been on regular diet.  Denies anorexia, numbness/tingling of extremities.     OBESITY, BMI 51  Onset: after 1st surgery in 2004  Diet: regular  Exercise: N  No temperature intolerance. No change in hair/skin quality, BMs.   No HTN, buffalo hump, purple striae, flushing.  FH of obesity: parents, siblings x 2    Reviewed PMH, PSH, FH, SH, ALL, HCM/IMM, no changes  Reviewed MEDS, no changes    Patient Active Problem List    Diagnosis Date Noted   • Essential hypertension 06/18/2018   • Health care maintenance 06/18/2018   • Anemia 06/18/2018   • Palpitations 06/18/2018     CURRENT MEDICATIONS  Current Outpatient Medications   Medication Sig Dispense Refill   • losartan (COZAAR) 50 MG Tab TAKE 1 TABLET BY MOUTH EVERY DAY 30 Tab 0   • Cyanocobalamin (B-12) 2500 MCG Tab Take 1 Tab by mouth every day. 90 Tab 1   • meloxicam (MOBIC) 15 MG tablet Take 1 Tab by mouth every day. 60 Tab 0   • ibuprofen (MOTRIN) 800 MG Tab Take 800 mg by mouth every 8 hours as needed.       No current facility-administered medications for this visit.      ALLERGIES  Allergies: Dilaudid  "[hydromorphone]  PAST MEDICAL HISTORY  Past Medical History:   Diagnosis Date   • Hypertension    • Obesity      SURGICAL HISTORY  He  has a past surgical history that includes federico by laparoscopy (2017) and fusion.  SOCIAL HISTORY  Social History     Tobacco Use   • Smoking status: Former Smoker     Quit date: 1997     Years since quittin.5   • Smokeless tobacco: Never Used   Substance Use Topics   • Alcohol use: No   • Drug use: No     Social History     Social History Narrative   • Not on file     FAMILY HISTORY  Family History   Problem Relation Age of Onset   • Lung Disease Mother         copd   • Hypertension Father    • Stroke Father    • Hyperlipidemia Father    • Hypertension Brother    • Cancer Maternal Grandmother         breast     Family Status   Relation Name Status   • Mo  Alive   • Fa     • Bro  (Not Specified)   • MGMo  (Not Specified)       ROS   Constitutional: Negative for fever, chills, fatigue.  HENT: Negative for congestion, sore throat.  Eyes: Negative for vision problems.   Respiratory: Negative for cough, shortness of breath.  Cardiovascular: Negative for chest pain, palpitations.   Gastrointestinal: Negative for heartburn, nausea, abdominal pain.   Genitourinary: Negative for dysuria.  Musculoskeletal: Negative for significant myalgia, back and joint pain.   Skin: Negative for rash.   Neuro: Negative for dizziness, weakness and headaches.   Endo/Heme/Allergies: Does not bruise/bleed easily.   Psychiatric/Behavioral: Negative for depression.    PHYSICAL EXAM   Blood Pressure 124/84 (BP Location: Left arm, Patient Position: Sitting, BP Cuff Size: Adult)   Pulse 81   Temperature 36.9 °C (98.4 °F) (Temporal)   Height 1.753 m (5' 9\")   Weight (Abnormal) 157.1 kg (346 lb 6.4 oz)   Oxygen Saturation 98%  Body mass index is 51.15 kg/m².  General:  NAD, well appearing  HEENT:   NC/AT, PERRLA, EOMI.  Cardiovascular: unlabored breathing, no peripheral cyanosis or " swelling.     Lungs:   no respiratory distress.  Abdomen: non- distended.  Extremities:  No LE swelling.  Skin:  Warm, dry.  No erythema. No rash.   Neurologic: Alert & oriented x 3. CN II-XII grossly intact. No focal deficits.  Psychiatric:  Affect normal, mood normal, judgment normal.    Labs     Labs are reviewed and discussed with a patient  Lab Results   Component Value Date/Time    CHOLSTRLTOT 165 02/08/2018 08:09 AM    LDL 97 02/08/2018 08:09 AM    HDL 40 02/08/2018 08:09 AM    TRIGLYCERIDE 139 02/08/2018 08:09 AM       Lab Results   Component Value Date/Time    SODIUM 136 03/25/2020 08:28 AM    POTASSIUM 4.0 03/25/2020 08:28 AM    CHLORIDE 101 03/25/2020 08:28 AM    CO2 26 03/25/2020 08:28 AM    GLUCOSE 92 03/25/2020 08:28 AM    BUN 13 03/25/2020 08:28 AM    CREATININE 1.08 03/25/2020 08:28 AM     Lab Results   Component Value Date/Time    ALKPHOSPHAT 97 03/25/2020 08:28 AM    ASTSGOT 14 03/25/2020 08:28 AM    ALTSGPT 23 03/25/2020 08:28 AM    TBILIRUBIN 0.5 03/25/2020 08:28 AM      Lab Results   Component Value Date/Time    WBC 7.3 03/25/2020 08:28 AM    RBC 6.13 (H) 03/25/2020 08:28 AM    HEMOGLOBIN 15.8 03/25/2020 08:28 AM    HEMATOCRIT 49.4 03/25/2020 08:28 AM    MCV 80.6 (L) 03/25/2020 08:28 AM    MCH 25.8 (L) 03/25/2020 08:28 AM    MCHC 32.0 (L) 03/25/2020 08:28 AM    MPV 9.4 03/25/2020 08:28 AM    NEUTSPOLYS 53.00 03/25/2020 08:28 AM    LYMPHOCYTES 35.70 03/25/2020 08:28 AM    MONOCYTES 7.60 03/25/2020 08:28 AM    EOSINOPHILS 2.60 03/25/2020 08:28 AM    BASOPHILS 0.80 03/25/2020 08:28 AM      Imaging     None    Assessment and Plan     Noah Cervantes is a 40 y.o. male    1. Annual physical exam  Reviewed PMH, PSH, FH, SH, ALL, MEDS, HCM/IMM.   Advised healthy habits, diet, exercise.    2. Health care maintenance  3. Immunization counseling  Per HPI    4. Essential hypertension  Controlled, continue with current treatment.  - losartan (COZAAR) 50 MG Tab; Take 1 Tab by mouth every day.  Dispense:  90 Tab; Refill: 0    5. Anemia, unspecified type  FIT test, advised on these were negative  - CBC WITH DIFFERENTIAL; Future  - HEMOGLOBINOPATHY PROFILE; Future    6. B12 deficiency  Improved with supplement, follow-up labs  - CBC WITH DIFFERENTIAL; Future  - VIT B12,  FOLIC ACID    7. BMI 50.0-59.9, adult (HCC)  Advised low-calorie diet, daily exercise:  - phentermine (ADIPEX-P) 37.5 MG tablet; Take 1 Tab by mouth every morning before breakfast for 30 days.  Dispense: 30 Tab; Refill: 0    Obtained and reviewed patient utilization report from [facility] pharmacy database on 9/15/2020 11:50 AM  prior to writing prescription for controlled substance II, III or IV per [place] bill . Based on assessment of the report, the prescription is medically necessary.     Counseling:   - Smoking:  Nonsmoker    Followup: not later than 3 months, labs, med refill    All questions are answered.    Please note that this dictation was created using voice recognition software, and that there might be errors of raul and possibly content.

## 2020-10-13 ENCOUNTER — TELEMEDICINE (OUTPATIENT)
Dept: MEDICAL GROUP | Age: 40
End: 2020-10-13
Payer: COMMERCIAL

## 2020-10-13 VITALS
HEIGHT: 69 IN | SYSTOLIC BLOOD PRESSURE: 143 MMHG | HEART RATE: 78 BPM | DIASTOLIC BLOOD PRESSURE: 105 MMHG | WEIGHT: 315 LBS | BODY MASS INDEX: 46.65 KG/M2

## 2020-10-13 DIAGNOSIS — Z71.85 IMMUNIZATION COUNSELING: ICD-10-CM

## 2020-10-13 DIAGNOSIS — I10 ESSENTIAL HYPERTENSION: ICD-10-CM

## 2020-10-13 DIAGNOSIS — R00.2 PALPITATIONS: ICD-10-CM

## 2020-10-13 DIAGNOSIS — Z00.00 HEALTH CARE MAINTENANCE: ICD-10-CM

## 2020-10-13 PROCEDURE — 99214 OFFICE O/P EST MOD 30 MIN: CPT | Mod: 95,CR | Performed by: INTERNAL MEDICINE

## 2020-10-13 RX ORDER — TOPIRAMATE 50 MG/1
50 TABLET, FILM COATED ORAL 2 TIMES DAILY
Qty: 60 TAB | Refills: 3 | Status: SHIPPED | OUTPATIENT
Start: 2020-10-13 | End: 2020-10-13

## 2020-10-13 RX ORDER — PHENTERMINE HYDROCHLORIDE 37.5 MG/1
37.5 TABLET ORAL
Qty: 30 TAB | Refills: 0 | Status: SHIPPED | OUTPATIENT
Start: 2020-10-13 | End: 2020-11-12

## 2020-10-13 ASSESSMENT — FIBROSIS 4 INDEX: FIB4 SCORE: 0.42

## 2020-10-13 NOTE — PROGRESS NOTES
Telemedicine Visit: Established Patient     This Remote Face to Face encounter was conducted via Zoom. Given the importance of social distancing and other strategies recommended to reduce the risk of COVID-19 transmission, I am providing medical care to this patient via audio/video visit in place of an in person visit at the request of the patient. Verbal consent to telehealth, risks, benefits, and consequences were discussed. Patient retains the right to withdraw at any time. All existing confidentiality protections apply. The patient has access to all transmitted medical information. No dissemination of any patient images or information to other entities without further written consent.    CHIEF COMPLAINT     Chief Complaint   Patient presents with   • Medication Refill     phentermine     HPI  Noah Cervantes is a 40 y.o. male who presents today for the following     BMI 49, hypertension, palpitations  39 y/o, M, with HTN/palpitations    Onset: after 1st surgery in 2004  Diet: regular  Exercise: N  No temperature intolerance. No change in hair/skin quality, BMs.   No HTN, buffalo hump, purple striae, flushing.  FH of obesity: parents, siblings x 2    HCM  Recommendations/advised:  Regular exercise at least 4 days a week  Diet: advised balanced   Dental exam at least 1-2 times per year  Sunscreen use.     Immunization counseling:  TdaP:  UTD  Influenza: advised    Reviewed PMH, PSH, FH, SH, ALL, HCM/IMM, no changes  Reviewed MEDS, no changes    Patient Active Problem List    Diagnosis Date Noted   • Essential hypertension 06/18/2018   • Health care maintenance 06/18/2018   • Anemia 06/18/2018   • Palpitations 06/18/2018     CURRENT MEDICATIONS  Current Outpatient Medications   Medication Sig Dispense Refill   • phentermine (ADIPEX-P) 37.5 MG tablet Take 1 Tab by mouth every morning before breakfast for 30 days. 30 Tab 0   • topiramate (TOPAMAX) 50 MG tablet Take 1 Tab by mouth 2 times a day. 60 Tab 3   •  "losartan (COZAAR) 50 MG Tab Take 1 Tab by mouth every day. 90 Tab 0     No current facility-administered medications for this visit.      ALLERGIES  Allergies: Dilaudid [hydromorphone]  PAST MEDICAL HISTORY  Past Medical History:   Diagnosis Date   • Hypertension    • Obesity      SURGICAL HISTORY  He  has a past surgical history that includes federico by laparoscopy (2017) and fusion.  SOCIAL HISTORY  Social History     Tobacco Use   • Smoking status: Former Smoker     Quit date: 1997     Years since quittin.6   • Smokeless tobacco: Never Used   Substance Use Topics   • Alcohol use: No   • Drug use: No     Social History     Social History Narrative   • Not on file     FAMILY HISTORY  Family History   Problem Relation Age of Onset   • Lung Disease Mother         copd   • Hypertension Father    • Stroke Father    • Hyperlipidemia Father    • Hypertension Brother    • Cancer Maternal Grandmother         breast     Family Status   Relation Name Status   • Mo  Alive   • Fa     • Bro  (Not Specified)   • MGMo  (Not Specified)       ROS   Constitutional: Negative for fever, chills, fatigue.  HENT: Negative for congestion, sore throat.  Eyes: Negative for vision problems.   Respiratory: Negative for cough, shortness of breath.  Cardiovascular: Negative for chest pain.   Gastrointestinal: Negative for heartburn, nausea, abdominal pain.   Genitourinary: Negative for dysuria.  Musculoskeletal: Negative for significant myalgia, back and joint pain.   Skin: Negative for rash.   Neuro: Negative for dizziness, weakness and headaches.   Endo/Heme/Allergies: Does not bruise/bleed easily.   Psychiatric/Behavioral: Negative for depression.    Objective   Vitals obtained by patient:  Blood Pressure 143/105 (BP Location: Left arm, Patient Position: Sitting)   Pulse 78   Height 1.753 m (5' 9\")   Weight (Abnormal) 150.6 kg (332 lb)   Body Mass Index 49.03 kg/m²   Physical Exam:  Constitutional: Alert, no " distress, well-groomed.  Skin: No rash in visible areas.  Eye: Round. Conjunctiva clear, lids normal.  ENMT: Lips pink without lesions, good dentition. Phonation normal.  Neck: No visible masses or thyromegaly. Moves freely without pain.  CV: no peripheral cyanosis, tachycardia.  Respiratory: Unlabored respiratory effort, no cough or audible wheezing.  Psych: Alert and oriented x3, normal affect and mood.     Labs     Labs are reviewed and discussed with a patient  Lab Results   Component Value Date/Time    CHOLSTRLTOT 165 02/08/2018 08:09 AM    LDL 97 02/08/2018 08:09 AM    HDL 40 02/08/2018 08:09 AM    TRIGLYCERIDE 139 02/08/2018 08:09 AM       Lab Results   Component Value Date/Time    SODIUM 136 03/25/2020 08:28 AM    POTASSIUM 4.0 03/25/2020 08:28 AM    CHLORIDE 101 03/25/2020 08:28 AM    CO2 26 03/25/2020 08:28 AM    GLUCOSE 92 03/25/2020 08:28 AM    BUN 13 03/25/2020 08:28 AM    CREATININE 1.08 03/25/2020 08:28 AM     Lab Results   Component Value Date/Time    ALKPHOSPHAT 97 03/25/2020 08:28 AM    ASTSGOT 14 03/25/2020 08:28 AM    ALTSGPT 23 03/25/2020 08:28 AM    TBILIRUBIN 0.5 03/25/2020 08:28 AM      Lab Results   Component Value Date/Time    WBC 7.3 03/25/2020 08:28 AM    RBC 6.13 (H) 03/25/2020 08:28 AM    HEMOGLOBIN 15.8 03/25/2020 08:28 AM    HEMATOCRIT 49.4 03/25/2020 08:28 AM    MCV 80.6 (L) 03/25/2020 08:28 AM    MCH 25.8 (L) 03/25/2020 08:28 AM    MCHC 32.0 (L) 03/25/2020 08:28 AM    MPV 9.4 03/25/2020 08:28 AM    NEUTSPOLYS 53.00 03/25/2020 08:28 AM    LYMPHOCYTES 35.70 03/25/2020 08:28 AM    MONOCYTES 7.60 03/25/2020 08:28 AM    EOSINOPHILS 2.60 03/25/2020 08:28 AM    BASOPHILS 0.80 03/25/2020 08:28 AM      Imaging      None    Assessment and Plan     Noah Cervantes is a 40 y.o. male    1. BMI 50.0-59.9, adult (HCC)  Congratulated for significant weight loss, advised to continue current treatment regimen with low calorie diet, daily exercise  - phentermine (ADIPEX-P) 37.5 MG tablet; Take 1  Tab by mouth every morning before breakfast for 30 days.  Dispense: 30 Tab; Refill: 0    2. Essential hypertension  Blood pressure today was high, but he did not take his medication  3. Palpitations  Comorbidities that can improve with weight loss    4. Health care maintenance  UTD    5. Immunization counseling  Advised flu vaccine    Follow-up: in 4 weeks, phentermine refill

## 2020-12-11 ENCOUNTER — TELEMEDICINE (OUTPATIENT)
Dept: MEDICAL GROUP | Age: 40
End: 2020-12-11
Payer: COMMERCIAL

## 2020-12-11 VITALS
WEIGHT: 315 LBS | HEART RATE: 91 BPM | HEIGHT: 69 IN | DIASTOLIC BLOOD PRESSURE: 89 MMHG | BODY MASS INDEX: 46.65 KG/M2 | SYSTOLIC BLOOD PRESSURE: 147 MMHG

## 2020-12-11 DIAGNOSIS — R00.2 PALPITATIONS: ICD-10-CM

## 2020-12-11 DIAGNOSIS — E66.01 OBESITY, CLASS III, BMI 40-49.9 (MORBID OBESITY) (HCC): ICD-10-CM

## 2020-12-11 DIAGNOSIS — I10 ESSENTIAL HYPERTENSION: ICD-10-CM

## 2020-12-11 DIAGNOSIS — Z00.00 HEALTH CARE MAINTENANCE: ICD-10-CM

## 2020-12-11 PROCEDURE — 99214 OFFICE O/P EST MOD 30 MIN: CPT | Mod: 95,CR | Performed by: INTERNAL MEDICINE

## 2020-12-11 RX ORDER — LOSARTAN POTASSIUM 100 MG/1
100 TABLET ORAL
Qty: 90 TAB | Refills: 4 | Status: SHIPPED | OUTPATIENT
Start: 2020-12-11

## 2020-12-11 ASSESSMENT — FIBROSIS 4 INDEX: FIB4 SCORE: 0.42

## 2020-12-11 NOTE — PROGRESS NOTES
Telemedicine Visit: Established Patient     This Remote Face to Face encounter was conducted via Zoom. Given the importance of social distancing and other strategies recommended to reduce the risk of COVID-19 transmission, I am providing medical care to this patient via audio/video visit in place of an in person visit at the request of the patient. Verbal consent to telehealth, risks, benefits, and consequences were discussed. Patient retains the right to withdraw at any time. All existing confidentiality protections apply. The patient has access to all transmitted medical information. No dissemination of any patient images or information to other entities without further written consent.    CHIEF COMPLAINT     Chief Complaint   Patient presents with   • Medication Refill     HPI  Noah Cervantes is a 40 y.o. male who presents today for the following     Hypertension, uncontrolled  Palpitations, BMI 49  41 y/o, M, with HTN/palpitations  - did not tolerated phentermine due to sleep problem, stopped    Meds: Losartan 50 mg QD, taking as prescribed.   Denies: - headaches, vision problems, tinnitus.  - chest pain/pressure, palpitations, irregular heart beats, exertional, dyspnea, peripheral edema.  - medication side effect: unusual fatigue, foot/leg swelling, cough.  Low salt diet: N  Diet: regular  Exercise: N  BMI: 49  FH of HTN: brother    Reviewed PMH, PSH, FH, SH, ALL, HCM/IMM, no changes  Reviewed MEDS, no changes    Patient Active Problem List    Diagnosis Date Noted   • Essential hypertension 06/18/2018   • Health care maintenance 06/18/2018   • Anemia 06/18/2018   • Palpitations 06/18/2018     CURRENT MEDICATIONS  Current Outpatient Medications   Medication Sig Dispense Refill   • losartan (COZAAR) 50 MG Tab Take 1 Tab by mouth every day. 90 Tab 0     No current facility-administered medications for this visit.      ALLERGIES  Allergies: Dilaudid [hydromorphone]  PAST MEDICAL HISTORY  Past Medical History:  "  Diagnosis Date   • Hypertension    • Obesity      SURGICAL HISTORY  He  has a past surgical history that includes federico by laparoscopy (2017) and fusion.  SOCIAL HISTORY  Social History     Tobacco Use   • Smoking status: Former Smoker     Quit date: 1997     Years since quittin.8   • Smokeless tobacco: Never Used   Substance Use Topics   • Alcohol use: No   • Drug use: No     Social History     Social History Narrative   • Not on file     FAMILY HISTORY  Family History   Problem Relation Age of Onset   • Lung Disease Mother         copd   • Hypertension Father    • Stroke Father    • Hyperlipidemia Father    • Hypertension Brother    • Cancer Maternal Grandmother         breast     Family Status   Relation Name Status   • Mo  Alive   • Fa     • Bro  (Not Specified)   • MGMo  (Not Specified)     ROS   Constitutional: Negative for fever, chills, fatigue.  HENT: Negative for congestion, sore throat.  Eyes: Negative for vision problems.   Respiratory: Negative for cough, shortness of breath.  Cardiovascular: Negative for chest pain, palpitations.   Gastrointestinal: Negative for heartburn, nausea, abdominal pain.   Genitourinary: Negative for dysuria.  Musculoskeletal: Negative for significant myalgia, back and joint pain.   Skin: Negative for rash.   Neuro: Negative for dizziness, weakness and headaches.   Endo/Heme/Allergies: Does not bruise/bleed easily.   Psychiatric/Behavioral: Negative for depression.    Objective   Vitals obtained by patient:  Blood Pressure 147/89 (BP Location: Left arm, Patient Position: Sitting)   Pulse 91   Height 1.753 m (5' 9\")   Weight (Abnormal) 150.6 kg (332 lb)   Body Mass Index 49.03 kg/m²   Physical Exam:  Constitutional: Alert, no distress, well-groomed.  Skin: No rash in visible areas.  Eye: Round. Conjunctiva clear, lids normal.  ENMT: Lips pink without lesions, good dentition. Phonation normal.  Neck: No visible masses or thyromegaly. Moves freely " without pain.  CV: no peripheral cyanosis, tachycardia.  Respiratory: Unlabored respiratory effort, no cough or audible wheezing.  Psych: Alert and oriented x3, normal affect and mood.     Labs     Labs are reviewed and discussed with a patient  Lab Results   Component Value Date/Time    CHOLSTRLTOT 165 02/08/2018 08:09 AM    LDL 97 02/08/2018 08:09 AM    HDL 40 02/08/2018 08:09 AM    TRIGLYCERIDE 139 02/08/2018 08:09 AM       Lab Results   Component Value Date/Time    SODIUM 136 03/25/2020 08:28 AM    POTASSIUM 4.0 03/25/2020 08:28 AM    CHLORIDE 101 03/25/2020 08:28 AM    CO2 26 03/25/2020 08:28 AM    GLUCOSE 92 03/25/2020 08:28 AM    BUN 13 03/25/2020 08:28 AM    CREATININE 1.08 03/25/2020 08:28 AM     Lab Results   Component Value Date/Time    ALKPHOSPHAT 97 03/25/2020 08:28 AM    ASTSGOT 14 03/25/2020 08:28 AM    ALTSGPT 23 03/25/2020 08:28 AM    TBILIRUBIN 0.5 03/25/2020 08:28 AM      Lab Results   Component Value Date/Time    WBC 7.3 03/25/2020 08:28 AM    RBC 6.13 (H) 03/25/2020 08:28 AM    HEMOGLOBIN 15.8 03/25/2020 08:28 AM    HEMATOCRIT 49.4 03/25/2020 08:28 AM    MCV 80.6 (L) 03/25/2020 08:28 AM    MCH 25.8 (L) 03/25/2020 08:28 AM    MCHC 32.0 (L) 03/25/2020 08:28 AM    MPV 9.4 03/25/2020 08:28 AM    NEUTSPOLYS 53.00 03/25/2020 08:28 AM    LYMPHOCYTES 35.70 03/25/2020 08:28 AM    MONOCYTES 7.60 03/25/2020 08:28 AM    EOSINOPHILS 2.60 03/25/2020 08:28 AM    BASOPHILS 0.80 03/25/2020 08:28 AM      Imaging      None    Assessment and Plan     Noah Cervantes is a 40 y.o. male    1. Essential hypertension  Uncontrolled  Increase losartan from 50 mg 200 mg daily    - losartan (COZAAR) 100 MG Tab; Take 1 Tab by mouth every day.  Dispense: 90 Tab; Refill: 4  - REFERRAL TO [facility] (HIP)    2. Palpitations  May improve with blood pressure control    3. Obesity, Class III, BMI 40-49.9 (morbid obesity) (Formerly [facility])  Advised maximum 2000 kcal diet, daily exercise  - REFERRAL TO [facility] (HIP)    4. Health care maintenance  Advised flu shot    Follow-up: In 3 months and as needed

## 2021-08-02 ENCOUNTER — HOSPITAL ENCOUNTER (OUTPATIENT)
Facility: MEDICAL CENTER | Age: 41
End: 2021-08-02
Attending: INTERNAL MEDICINE
Payer: COMMERCIAL

## 2021-08-02 ENCOUNTER — TELEMEDICINE (OUTPATIENT)
Dept: MEDICAL GROUP | Age: 41
End: 2021-08-02
Payer: COMMERCIAL

## 2021-08-02 VITALS
TEMPERATURE: 98.6 F | BODY MASS INDEX: 46.65 KG/M2 | RESPIRATION RATE: 12 BRPM | HEIGHT: 69 IN | WEIGHT: 315 LBS | HEART RATE: 80 BPM

## 2021-08-02 DIAGNOSIS — J20.9 ACUTE BRONCHITIS, UNSPECIFIED ORGANISM: ICD-10-CM

## 2021-08-02 DIAGNOSIS — Z20.822 EXPOSURE TO COVID-19 VIRUS: ICD-10-CM

## 2021-08-02 DIAGNOSIS — R43.2 LOSS OF TASTE: ICD-10-CM

## 2021-08-02 DIAGNOSIS — J01.90 ACUTE SINUSITIS, RECURRENCE NOT SPECIFIED, UNSPECIFIED LOCATION: ICD-10-CM

## 2021-08-02 DIAGNOSIS — R43.0 LOSS OF SMELL: ICD-10-CM

## 2021-08-02 PROCEDURE — 99214 OFFICE O/P EST MOD 30 MIN: CPT | Mod: 95 | Performed by: INTERNAL MEDICINE

## 2021-08-02 PROCEDURE — U0003 INFECTIOUS AGENT DETECTION BY NUCLEIC ACID (DNA OR RNA); SEVERE ACUTE RESPIRATORY SYNDROME CORONAVIRUS 2 (SARS-COV-2) (CORONAVIRUS DISEASE [COVID-19]), AMPLIFIED PROBE TECHNIQUE, MAKING USE OF HIGH THROUGHPUT TECHNOLOGIES AS DESCRIBED BY CMS-2020-01-R: HCPCS

## 2021-08-02 PROCEDURE — U0005 INFEC AGEN DETEC AMPLI PROBE: HCPCS

## 2021-08-02 RX ORDER — BROMPHENIRAMINE MALEATE, PSEUDOEPHEDRINE HYDROCHLORIDE, AND DEXTROMETHORPHAN HYDROBROMIDE 2; 30; 10 MG/5ML; MG/5ML; MG/5ML
5 SYRUP ORAL EVERY 4 HOURS PRN
Qty: 840 ML | COMMUNITY
Start: 2021-08-02 | End: 2021-10-12

## 2021-08-02 RX ORDER — TRIAMCINOLONE ACETONIDE 1 MG/G
CREAM TOPICAL
COMMUNITY
Start: 2021-06-25 | End: 2021-08-02

## 2021-08-02 RX ORDER — AZITHROMYCIN 250 MG/1
TABLET, FILM COATED ORAL
Qty: 6 TABLET | Refills: 1 | Status: SHIPPED | OUTPATIENT
Start: 2021-08-02 | End: 2021-10-12

## 2021-08-02 ASSESSMENT — PATIENT HEALTH QUESTIONNAIRE - PHQ9: CLINICAL INTERPRETATION OF PHQ2 SCORE: 0

## 2021-08-02 ASSESSMENT — FIBROSIS 4 INDEX: FIB4 SCORE: 0.43

## 2021-08-02 NOTE — PROGRESS NOTES
Virtual Visit: Established Patient   This visit was conducted via Zoom using secure and encrypted videoconferencing technology. The patient was in a private location in the state of [place].    The patient's identity was confirmed and verbal consent was obtained for this virtual visit.    Subjective:   CC:   Chief Complaint   Patient presents with   • Coronavirus Screening     cough, loss of taste and smell, runny nose x2 wks        Noah Cervantes is a 41 y.o. male presenting for evaluation and management of:    New patient to me unable see PCP today.  He presents with a 2-week history of cough and have yellowish sputum and nasal congestion with per nasal discharge.  No chest pain PND orthopnea or diarrhea.  Does have loss of sense of taste and smell.  He has had no fever or shortness of breath.  Saliva test done outside facility was said to be negative a few days ago.  Not sure if this was a rapid antigen test nucleic acid test by PCR.  Results not available in record.  Got the results back in 24 hours.  Was not prescribed any antibiotics yet.    ROS    Denies any recent fevers or chills. No nausea or vomiting. No chest pains or shortness of breath.     Allergies   Allergen Reactions   • Dilaudid [Hydromorphone] Itching       Current medicines (including changes today)  Current Outpatient Medications   Medication Sig Dispense Refill   • azithromycin (ZITHROMAX) 250 MG Tab Take 2 tabs today then 1 per day for 4 days 6 tablet 1   • brompheniramine-pseudoephedrine-DM 30-2-10 MG/5ML syrup Take 5 mL by mouth every four hours as needed (Cough). 840 mL    • losartan (COZAAR) 100 MG Tab Take 1 Tab by mouth every day. 90 Tab 4     No current facility-administered medications for this visit.       Patient Active Problem List    Diagnosis Date Noted   • Essential hypertension 06/18/2018   • Health care maintenance 06/18/2018   • Anemia 06/18/2018   • Palpitations 06/18/2018       Family History   Problem Relation Age of  "Onset   • Lung Disease Mother         copd   • Hypertension Father    • Stroke Father    • Hyperlipidemia Father    • Hypertension Brother    • Cancer Maternal Grandmother         breast       He  has a past medical history of Hypertension and Obesity.  He  has a past surgical history that includes federico by laparoscopy (2/18/2017) and fusion.       Objective:   Ht 1.753 m (5' 9\")   Wt (!) 154 kg (340 lb)   BMI 50.21 kg/m²     Physical Exam:      Constitutional: Alert, no distress, well-groomed.  Skin: No rashes in visible areas.  Eye: Round. Conjunctiva clear, lids normal. No icterus.   ENMT: Lips pink without lesions, good dentition, moist mucous membranes. Phonation normal.  Neck: No masses, no thyromegaly. Moves freely without pain.  Respiratory: Unlabored respiratory effort, no cough or audible wheeze  Psych: Alert and oriented x3, normal affect and mood.       Assessment and Plan:   The following treatment plan was discussed:     1. Acute bronchitis, unspecified organism-rule out false negative saliva test for COVID-19.  Check nasal swab by PCR.  Patient to come in the office today to get this done and will give results within 48 hours.    Assuming that is going to be negative, we will start him on a Z-Leland for acute sinusitis bronchitis.  Recheck in 1 week with PCP.  - Miscellaneous Test; Future  - azithromycin (ZITHROMAX) 250 MG Tab; Take 2 tabs today then 1 per day for 4 days  Dispense: 6 tablet; Refill: 1  - brompheniramine-pseudoephedrine-DM 30-2-10 MG/5ML syrup; Take 5 mL by mouth every four hours as needed (Cough).  Dispense: 840 mL    2. Acute sinusitis, recurrence not specified, unspecified location  - Miscellaneous Test; Future    3. Loss of taste  - Miscellaneous Test; Future    4. Loss of smell  - Miscellaneous Test; Future    5. Exposure to COVID-19 virus  - Miscellaneous Test; Future        Follow-up: No follow-ups on file.         "

## 2021-08-03 LAB
COVID ORDER STATUS COVID19: NORMAL
SARS-COV-2 RNA RESP QL NAA+PROBE: NOTDETECTED
SPECIMEN SOURCE: NORMAL

## 2021-09-28 DIAGNOSIS — I10 ESSENTIAL HYPERTENSION: ICD-10-CM

## 2021-09-28 DIAGNOSIS — E78.5 DYSLIPIDEMIA: ICD-10-CM

## 2021-09-28 DIAGNOSIS — E55.9 HYPOVITAMINOSIS D: ICD-10-CM

## 2021-09-28 DIAGNOSIS — D64.9 ANEMIA, UNSPECIFIED TYPE: ICD-10-CM

## 2021-09-28 DIAGNOSIS — R53.83 FATIGUE, UNSPECIFIED TYPE: ICD-10-CM

## 2021-10-07 ENCOUNTER — HOSPITAL ENCOUNTER (OUTPATIENT)
Dept: LAB | Facility: MEDICAL CENTER | Age: 41
End: 2021-10-07
Attending: INTERNAL MEDICINE
Payer: COMMERCIAL

## 2021-10-07 DIAGNOSIS — I10 ESSENTIAL HYPERTENSION: ICD-10-CM

## 2021-10-07 DIAGNOSIS — E78.5 DYSLIPIDEMIA: ICD-10-CM

## 2021-10-07 DIAGNOSIS — E55.9 HYPOVITAMINOSIS D: ICD-10-CM

## 2021-10-07 DIAGNOSIS — D64.9 ANEMIA, UNSPECIFIED TYPE: ICD-10-CM

## 2021-10-07 DIAGNOSIS — R53.83 FATIGUE, UNSPECIFIED TYPE: ICD-10-CM

## 2021-10-07 LAB
25(OH)D3 SERPL-MCNC: 20 NG/ML (ref 30–100)
ALBUMIN SERPL BCP-MCNC: 3.8 G/DL (ref 3.2–4.9)
ALBUMIN/GLOB SERPL: 1.3 G/DL
ALP SERPL-CCNC: 97 U/L (ref 30–99)
ALT SERPL-CCNC: 20 U/L (ref 2–50)
ANION GAP SERPL CALC-SCNC: 8 MMOL/L (ref 7–16)
AST SERPL-CCNC: 15 U/L (ref 12–45)
BASOPHILS # BLD AUTO: 0.8 % (ref 0–1.8)
BASOPHILS # BLD: 0.07 K/UL (ref 0–0.12)
BILIRUB SERPL-MCNC: 0.6 MG/DL (ref 0.1–1.5)
BUN SERPL-MCNC: 11 MG/DL (ref 8–22)
CALCIUM SERPL-MCNC: 9 MG/DL (ref 8.5–10.5)
CHLORIDE SERPL-SCNC: 105 MMOL/L (ref 96–112)
CHOLEST SERPL-MCNC: 153 MG/DL (ref 100–199)
CO2 SERPL-SCNC: 24 MMOL/L (ref 20–33)
CREAT SERPL-MCNC: 1.07 MG/DL (ref 0.5–1.4)
EOSINOPHIL # BLD AUTO: 0.25 K/UL (ref 0–0.51)
EOSINOPHIL NFR BLD: 2.8 % (ref 0–6.9)
ERYTHROCYTE [DISTWIDTH] IN BLOOD BY AUTOMATED COUNT: 40.4 FL (ref 35.9–50)
FASTING STATUS PATIENT QL REPORTED: NORMAL
FOLATE SERPL-MCNC: 11.1 NG/ML
GLOBULIN SER CALC-MCNC: 3 G/DL (ref 1.9–3.5)
GLUCOSE SERPL-MCNC: 104 MG/DL (ref 65–99)
HCT VFR BLD AUTO: 47.8 % (ref 42–52)
HDLC SERPL-MCNC: 36 MG/DL
HGB BLD-MCNC: 15.5 G/DL (ref 14–18)
IMM GRANULOCYTES # BLD AUTO: 0.04 K/UL (ref 0–0.11)
IMM GRANULOCYTES NFR BLD AUTO: 0.5 % (ref 0–0.9)
IRON SATN MFR SERPL: 29 % (ref 15–55)
IRON SERPL-MCNC: 78 UG/DL (ref 50–180)
LDLC SERPL CALC-MCNC: 90 MG/DL
LYMPHOCYTES # BLD AUTO: 3.49 K/UL (ref 1–4.8)
LYMPHOCYTES NFR BLD: 39.3 % (ref 22–41)
MCH RBC QN AUTO: 25.8 PG (ref 27–33)
MCHC RBC AUTO-ENTMCNC: 32.4 G/DL (ref 33.7–35.3)
MCV RBC AUTO: 79.5 FL (ref 81.4–97.8)
MONOCYTES # BLD AUTO: 0.52 K/UL (ref 0–0.85)
MONOCYTES NFR BLD AUTO: 5.9 % (ref 0–13.4)
NEUTROPHILS # BLD AUTO: 4.5 K/UL (ref 1.82–7.42)
NEUTROPHILS NFR BLD: 50.7 % (ref 44–72)
NRBC # BLD AUTO: 0 K/UL
NRBC BLD-RTO: 0 /100 WBC
PLATELET # BLD AUTO: 316 K/UL (ref 164–446)
PMV BLD AUTO: 9.4 FL (ref 9–12.9)
POTASSIUM SERPL-SCNC: 4.5 MMOL/L (ref 3.6–5.5)
PROT SERPL-MCNC: 6.8 G/DL (ref 6–8.2)
RBC # BLD AUTO: 6.01 M/UL (ref 4.7–6.1)
SODIUM SERPL-SCNC: 137 MMOL/L (ref 135–145)
TIBC SERPL-MCNC: 265 UG/DL (ref 250–450)
TRIGL SERPL-MCNC: 135 MG/DL (ref 0–149)
TSH SERPL DL<=0.005 MIU/L-ACNC: 1.94 UIU/ML (ref 0.38–5.33)
UIBC SERPL-MCNC: 187 UG/DL (ref 110–370)
VIT B12 SERPL-MCNC: 376 PG/ML (ref 211–911)
WBC # BLD AUTO: 8.9 K/UL (ref 4.8–10.8)

## 2021-10-07 PROCEDURE — 84443 ASSAY THYROID STIM HORMONE: CPT

## 2021-10-07 PROCEDURE — 36415 COLL VENOUS BLD VENIPUNCTURE: CPT

## 2021-10-07 PROCEDURE — 83550 IRON BINDING TEST: CPT

## 2021-10-07 PROCEDURE — 82746 ASSAY OF FOLIC ACID SERUM: CPT

## 2021-10-07 PROCEDURE — 85025 COMPLETE CBC W/AUTO DIFF WBC: CPT

## 2021-10-07 PROCEDURE — 80061 LIPID PANEL: CPT

## 2021-10-07 PROCEDURE — 83540 ASSAY OF IRON: CPT

## 2021-10-07 PROCEDURE — 80053 COMPREHEN METABOLIC PANEL: CPT

## 2021-10-07 PROCEDURE — 82607 VITAMIN B-12: CPT

## 2021-10-07 PROCEDURE — 82306 VITAMIN D 25 HYDROXY: CPT

## 2021-10-12 ENCOUNTER — OFFICE VISIT (OUTPATIENT)
Dept: MEDICAL GROUP | Age: 41
End: 2021-10-12
Payer: COMMERCIAL

## 2021-10-12 VITALS
HEIGHT: 69 IN | SYSTOLIC BLOOD PRESSURE: 110 MMHG | DIASTOLIC BLOOD PRESSURE: 82 MMHG | HEART RATE: 104 BPM | BODY MASS INDEX: 46.65 KG/M2 | OXYGEN SATURATION: 96 % | TEMPERATURE: 98 F | WEIGHT: 315 LBS

## 2021-10-12 DIAGNOSIS — I10 ESSENTIAL HYPERTENSION: ICD-10-CM

## 2021-10-12 DIAGNOSIS — Z23 NEED FOR VACCINATION: ICD-10-CM

## 2021-10-12 DIAGNOSIS — E55.9 HYPOVITAMINOSIS D: ICD-10-CM

## 2021-10-12 DIAGNOSIS — R73.01 IFG (IMPAIRED FASTING GLUCOSE): ICD-10-CM

## 2021-10-12 DIAGNOSIS — R00.2 PALPITATIONS: ICD-10-CM

## 2021-10-12 DIAGNOSIS — D64.9 ANEMIA, UNSPECIFIED TYPE: ICD-10-CM

## 2021-10-12 DIAGNOSIS — Z00.00 HEALTH CARE MAINTENANCE: ICD-10-CM

## 2021-10-12 PROCEDURE — 99214 OFFICE O/P EST MOD 30 MIN: CPT | Performed by: INTERNAL MEDICINE

## 2021-10-12 RX ORDER — PHENTERMINE HYDROCHLORIDE 37.5 MG/1
37.5 TABLET ORAL
Qty: 30 TABLET | Refills: 2 | Status: SHIPPED | OUTPATIENT
Start: 2021-10-12 | End: 2022-01-10

## 2021-10-12 ASSESSMENT — FIBROSIS 4 INDEX: FIB4 SCORE: 0.45

## 2021-10-12 NOTE — PROGRESS NOTES
CHIEF COMPLAINT  Chief Complaint   Patient presents with   • Lab Results   • Other     dry cracked hands     HPI  Noah Cervantes is a 41 y.o. male who presents today for the following     Hypertension  Palpitations, BMI 49  42 y/o, M, with HTN/palpitations  - did not tolerated phentermine due to sleep problem, stopped     Meds: Losartan 100 mg QD, taking as prescribed.   Denies: - headaches, vision problems, tinnitus.  - chest pain/pressure, palpitations, irregular heart beats, exertional, dyspnea, peripheral edema.  - medication side effect: unusual fatigue, foot/leg swelling, cough.  Low salt diet: N  Diet: regular  Exercise: N  BMI: 51, was 49  FH of HTN: brother     IFG  The patient had elevated FBG.  No polydipsia, polyphagia, polyuria.  No abdominal pain, weight loss, fatigue.  Diet/exercise/BMI: As above  FH of DM: father    Hypovitaminosis D  The patient had low vitamin D level.  Vitamin D supplement: no.    Anemia  The patient had have borderline low RBC count, normal MCV.  Iron studies, B12/folate were normal.  On regular diet.    Reviewed PMH, PSH, FH, SH, ALL, HCM/IMM, no changes  Reviewed MEDS, no changes    Patient Active Problem List    Diagnosis Date Noted   • Essential hypertension 06/18/2018   • Health care maintenance 06/18/2018   • Anemia 06/18/2018   • Palpitations 06/18/2018     CURRENT MEDICATIONS  Current Outpatient Medications   Medication Sig Dispense Refill   • Loratadine (CLARITIN PO) Take  by mouth.     • losartan (COZAAR) 100 MG Tab Take 1 Tab by mouth every day. 90 Tab 4     No current facility-administered medications for this visit.     ALLERGIES  Allergies: Dilaudid [hydromorphone]  PAST MEDICAL HISTORY  Past Medical History:   Diagnosis Date   • Hypertension    • Obesity      SURGICAL HISTORY  He  has a past surgical history that includes federico by laparoscopy (2/18/2017) and fusion.  SOCIAL HISTORY  Social History     Tobacco Use   • Smoking status: Former Smoker     Quit  "date: 1997     Years since quittin.6   • Smokeless tobacco: Never Used   Vaping Use   • Vaping Use: Never used   Substance Use Topics   • Alcohol use: No   • Drug use: No     Social History     Social History Narrative   • Not on file     FAMILY HISTORY  Family History   Problem Relation Age of Onset   • Lung Disease Mother         copd   • Hypertension Father    • Stroke Father    • Hyperlipidemia Father    • Hypertension Brother    • Cancer Maternal Grandmother         breast     Family Status   Relation Name Status   • Mo  Alive   • Fa     • Bro  (Not Specified)   • MGMo  (Not Specified)     ROS   Constitutional: Negative for fever, chills.  Eyes: Negative for vision problems.   Respiratory: Negative for cough, shortness of breath.  Cardiovascular: Negative for chest pain, palpitations.   Gastrointestinal: Negative for heartburn, nausea, abdominal pain.   Genitourinary: Negative for dysuria.  Musculoskeletal: Negative for significant myalgia, back and joint pain.   Skin: Negative for rash.   Neuro: Negative for dizziness, weakness and headaches.   Endo/Heme/Allergies: Does not bruise/bleed easily.   Psychiatric/Behavioral: Negative for depression.    PHYSICAL EXAM   Blood Pressure 110/82 (BP Location: Left arm, Patient Position: Sitting, BP Cuff Size: Large adult long)   Pulse (Abnormal) 104   Temperature 36.7 °C (98 °F) (Temporal)   Height 1.753 m (5' 9\")   Weight (Abnormal) 158 kg (348 lb 12.8 oz)   Oxygen Saturation 96%   Body Mass Index 51.51 kg/m²   General:  NAD, well appearing  HEENT:   NC/AT, PERRLA, EOMI.  Cardiovascular: unlabored breathing, no peripheral cyanosis or swelling.  Lungs:   no respiratory distress.  Abdomen: non- distended.  Extremities:  No LE swelling.  Skin:  Warm, dry.  No erythema. No rash.   Neurologic: Alert & oriented x 3. CN II-XII grossly intact. No focal deficits.  Psychiatric:  Affect normal, mood normal, judgment normal.    Labs     Labs are reviewed and " discussed with a patient  Lab Results   Component Value Date/Time    CHOLSTRLTOT 153 10/07/2021 07:23 AM    LDL 90 10/07/2021 07:23 AM    HDL 36 (A) 10/07/2021 07:23 AM    TRIGLYCERIDE 135 10/07/2021 07:23 AM       Lab Results   Component Value Date/Time    SODIUM 137 10/07/2021 07:23 AM    POTASSIUM 4.5 10/07/2021 07:23 AM    CHLORIDE 105 10/07/2021 07:23 AM    CO2 24 10/07/2021 07:23 AM    GLUCOSE 104 (H) 10/07/2021 07:23 AM    BUN 11 10/07/2021 07:23 AM    CREATININE 1.07 10/07/2021 07:23 AM     Lab Results   Component Value Date/Time    ALKPHOSPHAT 97 10/07/2021 07:23 AM    ASTSGOT 15 10/07/2021 07:23 AM    ALTSGPT 20 10/07/2021 07:23 AM    TBILIRUBIN 0.6 10/07/2021 07:23 AM      Lab Results   Component Value Date/Time    WBC 8.9 10/07/2021 07:23 AM    RBC 6.01 10/07/2021 07:23 AM    HEMOGLOBIN 15.5 10/07/2021 07:23 AM    HEMATOCRIT 47.8 10/07/2021 07:23 AM    MCV 79.5 (L) 10/07/2021 07:23 AM    MCH 25.8 (L) 10/07/2021 07:23 AM    MCHC 32.4 (L) 10/07/2021 07:23 AM    MPV 9.4 10/07/2021 07:23 AM    NEUTSPOLYS 50.70 10/07/2021 07:23 AM    LYMPHOCYTES 39.30 10/07/2021 07:23 AM    MONOCYTES 5.90 10/07/2021 07:23 AM    EOSINOPHILS 2.80 10/07/2021 07:23 AM    BASOPHILS 0.80 10/07/2021 07:23 AM      Imaging     None    Assessment and Plan     Noah Cervantes is a 41 y.o. male    1. Essential hypertension  - Controlled, continue with current management.  - Comp Metabolic Panel; Standing  2. Palpitations  As above    3. BMI 50.0-59.9, adult (HCC)  Advised low-calorie diet, daily exercise as much as possible  - phentermine (ADIPEX-P) 37.5 MG tablet; Take 1 Tablet by mouth every morning before breakfast for 90 days.  Dispense: 30 Tablet; Refill: 2    4. IFG (impaired fasting glucose)  - Discussed about risk to develop DM.   - Advised low carb diet, exercise, watch for WT.   - Comp Metabolic Panel; Standing  - HEMOGLOBIN A1C; Standing    5. Hypovitaminosis D  He will start OTC supplement  - VITAMIN D,25 HYDROXY;  Standing    6. Anemia, unspecified type  Iron studies, B12 and folate were normal  - FERRITIN; Future  - CBC WITH DIFFERENTIAL; Standing    7. Need for vaccination  8. Health care maintenance  Declined flu shot    Counseling:   - Smoking:  Nonsmoker    Followup: Return in about 6 months (around 4/12/2022) for LABS.    All questions are answered.    Please note that this dictation was created using voice recognition software, and that there might be errors of raul and possibly content.

## (undated) DEVICE — SUTURE 0 VICRYL PLUS UR-6 - 27 INCH (36/BX)

## (undated) DEVICE — BAG, SPONGE COUNT 50600

## (undated) DEVICE — SUCTION INSTRUMENT YANKAUER BULBOUS TIP W/O VENT (50EA/CA)

## (undated) DEVICE — ELECTRODE DUAL RETURN W/ CORD - (50/PK)

## (undated) DEVICE — SHEAR CVD HARMONIC ACE 36CM - (6/BX) **REPLACED USE #11148 PART NUMBER HARH36***

## (undated) DEVICE — ELECTRODE 850 FOAM ADHESIVE - HYDROGEL RADIOTRNSPRNT (50/PK)

## (undated) DEVICE — SODIUM CHL IRRIGATION 0.9% 1000ML (12EA/CA)

## (undated) DEVICE — GLOVE, LITE (PAIR)

## (undated) DEVICE — CANISTER SUCTION RIGID RED 1500CC (40EA/CA)

## (undated) DEVICE — GLOVE BIOGEL SZ 6.5 SURGICAL PF LTX (50PR/BX 4BX/CA)

## (undated) DEVICE — HEMOSTAT ARISTA PWD 3 GRAM - (5/CA)

## (undated) DEVICE — TUBE CONNECTING SUCTION - CLEAR PLASTIC STERILE 72 IN (50EA/CA)

## (undated) DEVICE — TUBING INSUFFLATION - (10/BX)

## (undated) DEVICE — GOWN WARMING STANDARD FLEX - (30/CA)

## (undated) DEVICE — LACTATED RINGERS INJ 1000 ML - (14EA/CA 60CA/PF)

## (undated) DEVICE — TUBE E-T HI-LO CUFF 7.5MM (10EA/PK)

## (undated) DEVICE — TROCAR Z THREAD 12 X 100 - BLADED (6/BX)

## (undated) DEVICE — MASK ANESTHESIA ADULT  - (100/CA)

## (undated) DEVICE — CANNULA W/SEAL 5X100 Z-THRE - ADED KII (12/BX)

## (undated) DEVICE — SUTURE 4-0 MONOCRYL PLUS PS-2 - 27 INCH (36/BX)

## (undated) DEVICE — KIT ROOM DECONTAMINATION

## (undated) DEVICE — SUTURE GENERAL

## (undated) DEVICE — PROTECTOR ULNA NERVE - (36PR/CA)

## (undated) DEVICE — TROCAR 5X100 NON BLADED Z-TH - READ KII (6/BX)

## (undated) DEVICE — HEAD HOLDER JUNIOR/ADULT

## (undated) DEVICE — GOWN SURGEONS LARGE - (32/CA)

## (undated) DEVICE — SENSOR SPO2 NEO LNCS ADHESIVE (20/BX) SEE USER NOTES

## (undated) DEVICE — CHLORAPREP 26 ML APPLICATOR - ORANGE TINT(25/CA)

## (undated) DEVICE — BAG RETRIEVAL 10ML (10EA/BX)

## (undated) DEVICE — SET SUCTION/IRRIGATION WITH DISPOSABLE TIP (6/CA )PART #0250-070-520 IS A SUB

## (undated) DEVICE — WATER IRRIGATION STERILE 1000ML (12EA/CA)

## (undated) DEVICE — HUMID-VENT HEAT AND MOISTURE EXCHANGE- (50/BX)

## (undated) DEVICE — DERMABOND ADVANCED - (12EA/BX)

## (undated) DEVICE — Device

## (undated) DEVICE — NEEDLE INSFL 120MM 14GA VRRS - (20/BX)

## (undated) DEVICE — CLIP MED LG INTNL HRZN TI ESCP - (20/BX)

## (undated) DEVICE — ELECTRODE 5MM LHK LAPSCP STERILE DISP- MEGADYNE  (5/CA)

## (undated) DEVICE — ADHESIVE DERMABOND HVD MINI (12EA/BX)

## (undated) DEVICE — KIT ANESTHESIA W/CIRCUIT & 3/LT BAG W/FILTER (20EA/CA)

## (undated) DEVICE — GLOVE BIOGEL SZ 8 SURGICAL PF LTX - (50PR/BX 4BX/CA)